# Patient Record
Sex: FEMALE | Race: WHITE | Employment: PART TIME | URBAN - METROPOLITAN AREA
[De-identification: names, ages, dates, MRNs, and addresses within clinical notes are randomized per-mention and may not be internally consistent; named-entity substitution may affect disease eponyms.]

---

## 2018-06-28 ENCOUNTER — OFFICE VISIT (OUTPATIENT)
Dept: ORTHOPEDIC SURGERY | Age: 53
End: 2018-06-28

## 2018-06-28 VITALS
HEIGHT: 66 IN | HEART RATE: 61 BPM | BODY MASS INDEX: 19.29 KG/M2 | SYSTOLIC BLOOD PRESSURE: 109 MMHG | WEIGHT: 120 LBS | DIASTOLIC BLOOD PRESSURE: 70 MMHG

## 2018-06-28 DIAGNOSIS — M75.01 ADHESIVE CAPSULITIS OF RIGHT SHOULDER: ICD-10-CM

## 2018-06-28 DIAGNOSIS — M25.511 RIGHT SHOULDER PAIN, UNSPECIFIED CHRONICITY: Primary | ICD-10-CM

## 2018-06-28 PROCEDURE — 20610 DRAIN/INJ JOINT/BURSA W/O US: CPT | Performed by: ORTHOPAEDIC SURGERY

## 2018-06-28 PROCEDURE — 99203 OFFICE O/P NEW LOW 30 MIN: CPT | Performed by: ORTHOPAEDIC SURGERY

## 2018-06-28 ASSESSMENT — ENCOUNTER SYMPTOMS: BACK PAIN: 1

## 2018-07-03 ENCOUNTER — EVALUATION (OUTPATIENT)
Dept: PHYSICAL THERAPY | Age: 53
End: 2018-07-03

## 2018-07-03 DIAGNOSIS — M25.611 DECREASED ROM OF RIGHT SHOULDER: ICD-10-CM

## 2018-07-03 DIAGNOSIS — M25.511 PAIN IN JOINT OF RIGHT SHOULDER: ICD-10-CM

## 2018-07-03 DIAGNOSIS — M75.01 ADHESIVE CAPSULITIS OF RIGHT SHOULDER: Primary | ICD-10-CM

## 2018-07-03 PROCEDURE — 97110 THERAPEUTIC EXERCISES: CPT | Performed by: PHYSICAL THERAPIST

## 2018-07-03 PROCEDURE — 97112 NEUROMUSCULAR REEDUCATION: CPT | Performed by: PHYSICAL THERAPIST

## 2018-07-03 PROCEDURE — 97161 PT EVAL LOW COMPLEX 20 MIN: CPT | Performed by: PHYSICAL THERAPIST

## 2018-07-03 NOTE — FLOWSHEET NOTE
[facility]   Phone: [phone]    Fax: [phone]        Physical Therapy Daily Treatment Note  Date:  7/3/2018    Patient Name:  Vida Patricia  \"Bell\"  :  1965  MRN: [id number]  Medical/Treatment Diagnosis Information:  · Diagnosis: R shoulder adhesive capsulitis  ·    Insurance/Certification information:  PT Insurance Information: Medical [place]  Physician Information:  Referring Practitioner: Dr. Kai Tejeda of care signed (Y/N): sent 7/3/18    Date of Patient follow up with Physician: 18    G-Code (if applicable):      Date G-Code Applied:  7/3/18  PT G-Codes  Functional Assessment Tool Used: UEFI  Score: 40/80, 50% limitation  Functional Limitation: Carrying, moving and handling objects  Carrying, Moving and Handling Objects Current Status (): At least 40 percent but less than 60 percent impaired, limited or restricted  Carrying, Moving and Handling Objects Goal Status (): At least 1 percent but less than 20 percent impaired, limited or restricted    Progress Note: [x]  Yes  []  No  Next due by: Visit #10 or 8/3/18      Latex Allergy:  [x]NO      []YES  Preferred Language for Healthcare:   [x]English       []other:    Visit # Insurance Allowable   1 40     Pain level:   4-5/10     SUBJECTIVE:  See eval    OBJECTIVE: See eval     ROM PROM AROM  Comment:  7/3/18     L R L R     Flexion     158° 112°     Abduction     180° 62°     ER     T4 C7     IR     T3 T12     Other             Other                    Strength L R Comment:  7/3/18   Flexion 5/5 3+/5*     Abduction 5/5 NT     ER 5/5 4+/5     IR 5/5 3+/5*           Special Tests Results/Comment:  7/3/18: Special tests were deferred as patient is unable to achieve/maintain test positions.    Stephane Glover     Other: empty can            RESTRICTIONS/PRECAUTIONS: R shoulder adhesive capsulitis    Exercises:  Exercise/Equipment Resistance/Repetitions Comments assist with reaching prior level of function. 2. Patient will demonstrate increased R shoulder flexion, abduction, ER, and IR AROM to >160°, >160°, T3, and >T8 respectively, to allow for proper joint functioning as indicated by patients Functional Deficits. 3. Patient will demonstrate an increase in R shoulder strength in all planes by a half grade or greater to allow for proper functional mobility as indicated by patients Functional Deficits. 4. Patient will return to all functional activities without increased symptoms or restriction. 5. Patient will be able to complete bathing and dressing activities with R UE without increased pain in the R shoulder so that she may return to PLOF for all ADLs. 6. Patient will be able to play 9 holes of golf without increased pain in the R shoulder so that she may return to PLOF. Progression Towards Functional goals:  [] Patient is progressing as expected towards functional goals listed. [] Progression is slowed due to complexities listed. [] Progression has been slowed due to co-morbidities.   [x] Plan just implemented, too soon to assess goals progression  [] Other:     ASSESSMENT:  See eval    Treatment/Activity Tolerance:  [x] Patient tolerated treatment well [] Patient limited by fatique  [] Patient limited by pain  [] Patient limited by other medical complications  [] Other:     Prognosis: [x] Good [] Fair  [] Poor    Patient Requires Follow-up: [x] Yes  [] No    PLAN: See eval  [] Continue per plan of care [] Alter current plan (see comments)  [x] Plan of care initiated [] Hold pending MD visit [] Discharge    Electronically signed by:      Morgan Beltran license: [id number] license: [id number]     Zuleyka Julio PT, DPT

## 2018-07-03 NOTE — PLAN OF CARE
in self care activities   [x]Reduced participation in home management activities   [x]Reduced participation in work activities   [x]Reduced participation in social activities. []Reduced participation in sport/recreation activities. Classification:   []Signs/symptoms consistent with post-surgical status including decreased ROM, strength and function.   []Signs/symptoms consistent with joint sprain/strain   []Signs/symptoms consistent with shoulder impingement   []Signs/symptoms consistent with shoulder/elbow/wrist tendinopathy   []Signs/symptoms consistent with Rotator cuff tear   []Signs/symptoms consistent with labral tear   []Signs/symptoms consistent with postural dysfunction    []Signs/symptoms consistent with Glenohumeral IR Deficit - <45 degrees   []Signs/symptoms consistent with facet dysfunction of cervical/thoracic spine    []Signs/symptoms consistent with pathology which may benefit from Dry needling     [x]other: Signs/symptoms consistent with adhesive capsulitis     Prognosis/Rehab Potential:      []Excellent   [x]Good    []Fair   []Poor    Tolerance of evaluation/treatment:    []Excellent   [x]Good    []Fair   []Poor    Physical Therapy Evaluation Complexity Justification  [x] A history of present problem with:  [] no personal factors and/or comorbidities that impact the plan of care;  [x]1-2 personal factors and/or comorbidities that impact the plan of care  []3 personal factors and/or comorbidities that impact the plan of care  [x] An examination of body systems using standardized tests and measures addressing any of the following: body structures and functions (impairments), activity limitations, and/or participation restrictions;:  [] a total of 1-2 or more elements   [x] a total of 3 or more elements   [] a total of 4 or more elements   [x] A clinical presentation with:  [x] stable and/or uncomplicated characteristics   [] evolving clinical presentation with changing characteristics  [] functioning as indicated by patients Functional Deficits. 3. Patient will demonstrate an increase in R shoulder strength in all planes by a half grade or greater to allow for proper functional mobility as indicated by patients Functional Deficits. 4. Patient will return to all functional activities without increased symptoms or restriction. 5. Patient will be able to complete bathing and dressing activities with R UE without increased pain in the R shoulder so that she may return to PLOF for all ADLs. 6. Patient will be able to play 9 holes of golf without increased pain in the R shoulder so that she may return to PLOF.       Electronically signed by:       Rahul Kemp license: [id number]  [place] PT license: [id number]     Joseph Barrett PT

## 2018-07-05 ENCOUNTER — TREATMENT (OUTPATIENT)
Dept: PHYSICAL THERAPY | Age: 53
End: 2018-07-05

## 2018-07-05 DIAGNOSIS — M25.511 PAIN IN JOINT OF RIGHT SHOULDER: ICD-10-CM

## 2018-07-05 DIAGNOSIS — M75.01 ADHESIVE CAPSULITIS OF RIGHT SHOULDER: Primary | ICD-10-CM

## 2018-07-05 DIAGNOSIS — M25.611 DECREASED ROM OF RIGHT SHOULDER: ICD-10-CM

## 2018-07-05 PROCEDURE — 97140 MANUAL THERAPY 1/> REGIONS: CPT | Performed by: PHYSICAL THERAPIST

## 2018-07-05 PROCEDURE — 97112 NEUROMUSCULAR REEDUCATION: CPT | Performed by: PHYSICAL THERAPIST

## 2018-07-05 PROCEDURE — 97110 THERAPEUTIC EXERCISES: CPT | Performed by: PHYSICAL THERAPIST

## 2018-07-05 NOTE — FLOWSHEET NOTE
Alvarado Glover     Other: empty can            RESTRICTIONS/PRECAUTIONS: R shoulder adhesive capsulitis    Exercises:  Exercise/Equipment Resistance/Repetitions Comments   Cardio/Warm-up     Bike          Stretching/PROM     Wand 10x10\" At 0° abd   Table Slides 10x10\"    UE [place]     Pulleys 4'    Pendulum      BBIR 10x10\"    UT stretch 10x5\"    CBA 10x10\"    Biceps stretch 10x10\" W/ cane   Doorway pec stretch 10x10\"              STRENGTH     Isometrics     Retraction 20x5\"         Weight shift     Flexion     Abduction     External Rotation     Internal Rotation     Biceps     Triceps          PRE's     Flexion     Abduction     External Rotation     Internal Rotation     Shrugs     EXT     Reverse Flys     Serratus     Horizontal Abd with ER     Biceps     Triceps     Retraction          Cable Column/Theraband     External Rotation     Internal Rotation     Shrugs     Lats     Ext     Flex     Scapular Retraction     BIC     TRIC     PNF          Neuromuscular Re-ed     Dynamic Stability                              Plyoback                    Manual/Modalities       CP to R shoulder x10'     Manual PROM of R shoulders in all planes, 10'               Therapeutic Exercise and NMR EXR  [x] (34992) Provided verbal/tactile cueing for activities related to strengthening, flexibility, endurance, ROM  for improvements in scapular, scapulothoracic and UE control with self care, reaching, carrying, lifting, house/yardwork, driving/computer work. [x] (48766) Provided verbal/tactile cueing for activities related to improving balance, coordination, kinesthetic sense, posture, motor skill, proprioception  to assist with  scapular, scapulothoracic and UE control with self care, reaching, carrying, lifting, house/yardwork, driving/computer work.     Therapeutic Activities:    [] (00934 or 09840) Provided verbal/tactile cueing for activities related to improving balance, coordination, kinesthetic sense, posture, tolerance, in order to prevent re-injury. 2. Patient will have a decrease in pain to facilitate improvement in movement, function, and ADLs as indicated by Functional Deficits. Long Term Goals: To be achieved in: 6 weeks  1. Disability index score of 10% or less for the UEFI to assist with reaching prior level of function. 2. Patient will demonstrate increased R shoulder flexion, abduction, ER, and IR AROM to >160°, >160°, T3, and >T8 respectively, to allow for proper joint functioning as indicated by patients Functional Deficits. 3. Patient will demonstrate an increase in R shoulder strength in all planes by a half grade or greater to allow for proper functional mobility as indicated by patients Functional Deficits. 4. Patient will return to all functional activities without increased symptoms or restriction. 5. Patient will be able to complete bathing and dressing activities with R UE without increased pain in the R shoulder so that she may return to PLOF for all ADLs. 6. Patient will be able to play 9 holes of golf without increased pain in the R shoulder so that she may return to PLOF. Progression Towards Functional goals:  [] Patient is progressing as expected towards functional goals listed. [] Progression is slowed due to complexities listed. [] Progression has been slowed due to co-morbidities. [x] Plan just implemented, too soon to assess goals progression  [] Other:     ASSESSMENT:  Patient required moderate verbal and tactile cues today to correct form for exercises included in her HEP. She tolerated exercise progressions moderately well today, with no reports of increased pain in the R shoulder. PT and patient reviewed HEP and she expressed improved understanding of the program. Plan to progress mobility activities per patient tolerance.     Treatment/Activity Tolerance:  [x] Patient tolerated treatment well [] Patient limited by fatique  [] Patient limited by pain  [] Patient

## 2018-07-09 ENCOUNTER — TREATMENT (OUTPATIENT)
Dept: PHYSICAL THERAPY | Age: 53
End: 2018-07-09

## 2018-07-09 DIAGNOSIS — M25.511 PAIN IN JOINT OF RIGHT SHOULDER: ICD-10-CM

## 2018-07-09 DIAGNOSIS — M25.611 DECREASED ROM OF RIGHT SHOULDER: ICD-10-CM

## 2018-07-09 DIAGNOSIS — M75.01 ADHESIVE CAPSULITIS OF RIGHT SHOULDER: Primary | ICD-10-CM

## 2018-07-09 PROCEDURE — 97112 NEUROMUSCULAR REEDUCATION: CPT | Performed by: PHYSICAL THERAPIST

## 2018-07-09 PROCEDURE — 97140 MANUAL THERAPY 1/> REGIONS: CPT | Performed by: PHYSICAL THERAPIST

## 2018-07-09 PROCEDURE — 97110 THERAPEUTIC EXERCISES: CPT | Performed by: PHYSICAL THERAPIST

## 2018-07-09 NOTE — FLOWSHEET NOTE
[facility]   Phone: [phone]    Fax: [phone]        Physical Therapy Daily Treatment Note  Date:  2018    Patient Name:  Tata Guerrero  \"Bell\"  :  1965  MRN: [id number]  Medical/Treatment Diagnosis Information:  · Diagnosis: R shoulder adhesive capsulitis     Insurance/Certification information:  PT Insurance Information: Medical [place]  Physician Information:  Referring Practitioner: Dr. Miguelina Wang of care signed (Y/N): sent 7/3/18    Date of Patient follow up with Physician: 18    G-Code (if applicable):      Date G-Code Applied:  7/3/18  PT G-Codes  Functional Assessment Tool Used: UEFI  Score: 40/80, 50% limitation  Functional Limitation: Carrying, moving and handling objects  Carrying, Moving and Handling Objects Current Status (): At least 40 percent but less than 60 percent impaired, limited or restricted  Carrying, Moving and Handling Objects Goal Status (): At least 1 percent but less than 20 percent impaired, limited or restricted    Progress Note: []  Yes  [x]  No  Next due by: Visit #10 or 8/3/18      Latex Allergy:  [x]NO      []YES  Preferred Language for Healthcare:   [x]English       []other:    Visit # Insurance Allowable   3 40     Pain level:   4-5/10     SUBJECTIVE:  Patient reports that she is seeing improvements in her AROM and is better able to reach [place] with the R UE.    OBJECTIVE: See eval             ROM PROM AROM  Comment:  7/3/18     L R L R     Flexion     158° 112°     Abduction     180° 62°     ER     T4 C7     IR     T3 T12     Other             Other                    Strength L R Comment:  7/3/18   Flexion 5/5 3+/5*     Abduction 5/5 NT     ER 5/5 4+/5     IR 5/5 3+/5*           Special Tests Results/Comment:  7/3/18: Special tests were deferred as patient is unable to achieve/maintain test positions.    Stephane Glover     Other: empty can            RESTRICTIONS/PRECAUTIONS: R shoulder adhesive capsulitis    Exercises:  Exercise/Equipment Resistance/Repetitions Comments   Cardio/Warm-up     Bike          Stretching/PROM     Wand 10x10\" At 0° abd   Table Slides 10x10\"    UE [place]     Pulleys 5'    Pendulum      BBIR 10x10\"    UT stretch 10x5\"    CBA 10x10\"    Biceps stretch 10x10\" W/ cane   Doorway pec stretch 10x10\"    Wall slides 10x10\"         STRENGTH     Isometrics     Retraction 20x5\"         Weight shift     Flexion     Abduction     External Rotation     Internal Rotation     Biceps     Triceps          PRE's     Flexion     Abduction     External Rotation     Internal Rotation     Shrugs     EXT     Reverse Flys     Serratus     Horizontal Abd with ER     Biceps     Triceps     Retraction          Cable Column/Theraband     External Rotation     Internal Rotation     Shrugs     Lats     Ext     Flex     Scapular Retraction     BIC     TRIC     PNF          Neuromuscular Re-ed     Dynamic Stability                              Plyoback                    Manual/Modalities       CP to R shoulder x10'     Manual PROM of R shoulders in all planes, 10'               Therapeutic Exercise and NMR EXR  [x] (47757) Provided verbal/tactile cueing for activities related to strengthening, flexibility, endurance, ROM  for improvements in scapular, scapulothoracic and UE control with self care, reaching, carrying, lifting, house/yardwork, driving/computer work. [x] (06436) Provided verbal/tactile cueing for activities related to improving balance, coordination, kinesthetic sense, posture, motor skill, proprioception  to assist with  scapular, scapulothoracic and UE control with self care, reaching, carrying, lifting, house/yardwork, driving/computer work.     Therapeutic Activities:    [] (53931 or 63142) Provided verbal/tactile cueing for activities related to improving balance, coordination, kinesthetic sense, posture, motor skill, proprioception and motor activation to allow for proper function of scapular, scapulothoracic and UE control with self care, carrying, lifting, driving/computer work. Home Exercise Program:    [x] (60530) Reviewed/Progressed HEP activities related to strengthening, flexibility, endurance, ROM of scapular, scapulothoracic and UE control with self care, reaching, carrying, lifting, house/yardwork, driving/computer work  [] (67435) Reviewed/Progressed HEP activities related to improving balance, coordination, kinesthetic sense, posture, motor skill, proprioception of scapular, scapulothoracic and UE control with self care, reaching, carrying, lifting, house/yardwork, driving/computer work      Manual Treatments:  PROM / STM / Oscillations-Mobs:  G-I, II, III, IV (PA's, Inf., Post.)  [] (97057) Provided manual therapy to mobilize soft tissue/joints of cervical/CT, scapular GHJ and UE for the purpose of modulating pain, promoting relaxation,  increasing ROM, reducing/eliminating soft tissue swelling/inflammation/restriction, improving soft tissue extensibility and allowing for proper ROM for normal function with self care, reaching, carrying, lifting, house/yardwork, driving/computer work    Modalities:  CP to R shoulder x10'    Charges:  Timed Code Treatment Minutes: 50   Total Treatment Minutes: 70     [] EVAL (LOW) 32061 (typically 20 minutes face-to-face)  [] EVAL (MOD) 57019 (typically 30 minutes face-to-face)  [] EVAL (HIGH) 93101 (typically 45 minutes face-to-face)  [] RE-EVAL   [x] SY(45239) x  1   [] IONTO  [x] NMR (22147) x  1   [] VASO  [x] Manual (48729) x  1    [] Other:  [] TA (95744) x       [] Mech Traction (82833)  [] ES(attended) (00436)      [] ES (un) (36598):     GOALS:  Patient stated goal: Able to play golf without increased pain in the R shoulder. Therapist goals for Patient:   Short Term Goals: To be achieved in: 2 weeks  1. Independent in HEP and progression per patient tolerance, in order to prevent re-injury.    2. Patient will have a decrease in pain to facilitate improvement in movement, function, and ADLs as indicated by Functional Deficits. Long Term Goals: To be achieved in: 6 weeks  1. Disability index score of 10% or less for the UEFI to assist with reaching prior level of function. 2. Patient will demonstrate increased R shoulder flexion, abduction, ER, and IR AROM to >160°, >160°, T3, and >T8 respectively, to allow for proper joint functioning as indicated by patients Functional Deficits. 3. Patient will demonstrate an increase in R shoulder strength in all planes by a half grade or greater to allow for proper functional mobility as indicated by patients Functional Deficits. 4. Patient will return to all functional activities without increased symptoms or restriction. 5. Patient will be able to complete bathing and dressing activities with R UE without increased pain in the R shoulder so that she may return to PLOF for all ADLs. 6. Patient will be able to play 9 holes of golf without increased pain in the R shoulder so that she may return to PLOF. Progression Towards Functional goals:  [] Patient is progressing as expected towards functional goals listed. [] Progression is slowed due to complexities listed. [] Progression has been slowed due to co-morbidities. [x] Plan just implemented, too soon to assess goals progression  [] Other:     ASSESSMENT:  Patient demonstrates improving active ROM of the R shoulder throughout today's session. She requires moderate VCs to initiate all exercises and to achieve and maintain proper form. She was instructed to add table walk backs to her HEP. Plan to progress mobility activities per patient tolerance.     Treatment/Activity Tolerance:  [x] Patient tolerated treatment well [] Patient limited by fatique  [] Patient limited by pain  [] Patient limited by other medical complications  [] Other:     Prognosis: [x] Good [] Fair  [] Poor    Patient Requires Follow-up: [x] Yes  []

## 2018-07-11 ENCOUNTER — TREATMENT (OUTPATIENT)
Dept: PHYSICAL THERAPY | Age: 53
End: 2018-07-11

## 2018-07-11 DIAGNOSIS — M25.611 DECREASED ROM OF RIGHT SHOULDER: ICD-10-CM

## 2018-07-11 DIAGNOSIS — M25.511 PAIN IN JOINT OF RIGHT SHOULDER: ICD-10-CM

## 2018-07-11 DIAGNOSIS — M75.01 ADHESIVE CAPSULITIS OF RIGHT SHOULDER: Primary | ICD-10-CM

## 2018-07-11 PROCEDURE — 97110 THERAPEUTIC EXERCISES: CPT | Performed by: PHYSICAL THERAPIST

## 2018-07-11 PROCEDURE — 97140 MANUAL THERAPY 1/> REGIONS: CPT | Performed by: PHYSICAL THERAPIST

## 2018-07-11 PROCEDURE — 97112 NEUROMUSCULAR REEDUCATION: CPT | Performed by: PHYSICAL THERAPIST

## 2018-07-11 NOTE — FLOWSHEET NOTE
activities related to improving balance, coordination, kinesthetic sense, posture, motor skill, proprioception and motor activation to allow for proper function of scapular, scapulothoracic and UE control with self care, carrying, lifting, driving/computer work. Home Exercise Program:    [x] (22476) Reviewed/Progressed HEP activities related to strengthening, flexibility, endurance, ROM of scapular, scapulothoracic and UE control with self care, reaching, carrying, lifting, house/yardwork, driving/computer work  [] (68693) Reviewed/Progressed HEP activities related to improving balance, coordination, kinesthetic sense, posture, motor skill, proprioception of scapular, scapulothoracic and UE control with self care, reaching, carrying, lifting, house/yardwork, driving/computer work      Manual Treatments:  PROM / STM / Oscillations-Mobs:  G-I, II, III, IV (PA's, Inf., Post.)  [] (71824) Provided manual therapy to mobilize soft tissue/joints of cervical/CT, scapular GHJ and UE for the purpose of modulating pain, promoting relaxation,  increasing ROM, reducing/eliminating soft tissue swelling/inflammation/restriction, improving soft tissue extensibility and allowing for proper ROM for normal function with self care, reaching, carrying, lifting, house/yardwork, driving/computer work    Modalities:  CP to R shoulder x10'    Charges:  Timed Code Treatment Minutes: 40   Total Treatment Minutes: 50     [] EVAL (LOW) 39232 (typically 20 minutes face-to-face)  [] EVAL (MOD) 39081 (typically 30 minutes face-to-face)  [] EVAL (HIGH) 82653 (typically 45 minutes face-to-face)  [] RE-EVAL   [x] MD(73081) x  1   [] IONTO  [x] NMR (60535) x  1   [] VASO  [x] Manual (21687) x  1    [] Other:  [] TA (74492) x       [] Mech Traction (38480)  [] ES(attended) (55160)      [] ES (un) (07841):     GOALS:  Patient stated goal: Able to play golf without increased pain in the R shoulder.     Therapist goals for Patient:   Short Term Goals: reduce stress on L shoulder).     Treatment/Activity Tolerance:  [x] Patient tolerated treatment well [] Patient limited by fatique  [] Patient limited by pain  [] Patient limited by other medical complications  [] Other:     Prognosis: [x] Good [] Fair  [] Poor    Patient Requires Follow-up: [x] Yes  [] No    PLAN: See eval  [x] Continue per plan of care [] Alter current plan (see comments)  [] Plan of care initiated [] Hold pending MD visit [] Discharge    Electronically signed by:      Yue Catherine license: [id number]  [id number] license: [id number]     Sincere Johnson PT, DPT

## 2018-07-16 ENCOUNTER — TREATMENT (OUTPATIENT)
Dept: PHYSICAL THERAPY | Age: 53
End: 2018-07-16

## 2018-07-16 DIAGNOSIS — M25.611 DECREASED ROM OF RIGHT SHOULDER: ICD-10-CM

## 2018-07-16 DIAGNOSIS — M75.01 ADHESIVE CAPSULITIS OF RIGHT SHOULDER: Primary | ICD-10-CM

## 2018-07-16 DIAGNOSIS — M25.511 PAIN IN JOINT OF RIGHT SHOULDER: ICD-10-CM

## 2018-07-16 PROCEDURE — 97112 NEUROMUSCULAR REEDUCATION: CPT | Performed by: PHYSICAL THERAPIST

## 2018-07-16 PROCEDURE — 97110 THERAPEUTIC EXERCISES: CPT | Performed by: PHYSICAL THERAPIST

## 2018-07-16 PROCEDURE — 97140 MANUAL THERAPY 1/> REGIONS: CPT | Performed by: PHYSICAL THERAPIST

## 2018-07-16 NOTE — FLOWSHEET NOTE
weeks  1. Independent in HEP and progression per patient tolerance, in order to prevent re-injury. 2. Patient will have a decrease in pain to facilitate improvement in movement, function, and ADLs as indicated by Functional Deficits. Long Term Goals: To be achieved in: 6 weeks  1. Disability index score of 10% or less for the UEFI to assist with reaching prior level of function. 2. Patient will demonstrate increased R shoulder flexion, abduction, ER, and IR AROM to >160°, >160°, T3, and >T8 respectively, to allow for proper joint functioning as indicated by patients Functional Deficits. 3. Patient will demonstrate an increase in R shoulder strength in all planes by a half grade or greater to allow for proper functional mobility as indicated by patients Functional Deficits. 4. Patient will return to all functional activities without increased symptoms or restriction. 5. Patient will be able to complete bathing and dressing activities with R UE without increased pain in the R shoulder so that she may return to PLOF for all ADLs. 6. Patient will be able to play 9 holes of golf without increased pain in the R shoulder so that she may return to PLOF. Progression Towards Functional goals:  [] Patient is progressing as expected towards functional goals listed. [] Progression is slowed due to complexities listed. [] Progression has been slowed due to co-morbidities. [x] Plan just implemented, too soon to assess goals progression  [] Other:     ASSESSMENT:  Patient continues to demonstrate good progress in R shoulder PROM in all planes and R shoulder flexion AROM, though limitations remain present. She reports moderate fatigue following TB activities and ball on wall today. Plan to progress mobility and strengthening activities per patient tolerance.     Treatment/Activity Tolerance:  [x] Patient tolerated treatment well [] Patient limited by fatique  [] Patient limited by pain  [] Patient limited by

## 2018-07-18 ENCOUNTER — TREATMENT (OUTPATIENT)
Dept: PHYSICAL THERAPY | Age: 53
End: 2018-07-18

## 2018-07-18 DIAGNOSIS — M25.611 DECREASED ROM OF RIGHT SHOULDER: ICD-10-CM

## 2018-07-18 DIAGNOSIS — M25.511 PAIN IN JOINT OF RIGHT SHOULDER: ICD-10-CM

## 2018-07-18 DIAGNOSIS — M75.01 ADHESIVE CAPSULITIS OF RIGHT SHOULDER: Primary | ICD-10-CM

## 2018-07-18 PROCEDURE — 97112 NEUROMUSCULAR REEDUCATION: CPT | Performed by: PHYSICAL THERAPIST

## 2018-07-18 PROCEDURE — 97110 THERAPEUTIC EXERCISES: CPT | Performed by: PHYSICAL THERAPIST

## 2018-07-18 PROCEDURE — 97140 MANUAL THERAPY 1/> REGIONS: CPT | Performed by: PHYSICAL THERAPIST

## 2018-07-18 NOTE — FLOWSHEET NOTE
[facility]   Phone: [phone]    Fax: [phone]        Physical Therapy Daily Treatment Note  Date:  2018    Patient Name:  Shira Nair  \"Bell\"  :  1965  MRN: [id number]  Medical/Treatment Diagnosis Information:  · Diagnosis: R shoulder adhesive capsulitis     Insurance/Certification information:  PT Insurance Information: Medical [place]  Physician Information:  Referring Practitioner: Dr. Quincy Chavez of care signed (Y/N): sent 7/3/18    Date of Patient follow up with Physician: 18    G-Code (if applicable):      Date G-Code Applied:  7/3/18  PT G-Codes  Functional Assessment Tool Used: UEFI  Score: 40/80, 50% limitation  Functional Limitation: Carrying, moving and handling objects  Carrying, Moving and Handling Objects Current Status (): At least 40 percent but less than 60 percent impaired, limited or restricted  Carrying, Moving and Handling Objects Goal Status (): At least 1 percent but less than 20 percent impaired, limited or restricted    Progress Note: []  Yes  [x]  No  Next due by: Visit #10 or 8/3/18      Latex Allergy:  [x]NO      []YES  Preferred Language for Healthcare:   [x]English       []other:    Visit # Insurance Allowable   6 40     Pain level:   4-5/10     SUBJECTIVE:  Patient reports she still has limitations, but notes that motion seems to be improving in all directions, especially when reaching behind her back. OBJECTIVE: See eval             ROM PROM AROM  Comment:  7/3/18     L R L R     Flexion     158° 112°     Abduction     180° 62°     ER     T4 C7     IR     T3 T12     Other             Other                    Strength L R Comment:  7/3/18   Flexion 5/5 3+/5*     Abduction 5/5 NT     ER 5/5 4+/5     IR 5/5 3+/5*           Special Tests Results/Comment:  7/3/18: Special tests were deferred as patient is unable to achieve/maintain test positions.    Stephane Glover     Other: empty

## 2018-07-23 ENCOUNTER — TREATMENT (OUTPATIENT)
Dept: PHYSICAL THERAPY | Age: 53
End: 2018-07-23

## 2018-07-23 DIAGNOSIS — M75.01 ADHESIVE CAPSULITIS OF RIGHT SHOULDER: Primary | ICD-10-CM

## 2018-07-23 DIAGNOSIS — M25.611 DECREASED ROM OF RIGHT SHOULDER: ICD-10-CM

## 2018-07-23 DIAGNOSIS — M25.511 PAIN IN JOINT OF RIGHT SHOULDER: ICD-10-CM

## 2018-07-23 PROCEDURE — 97112 NEUROMUSCULAR REEDUCATION: CPT | Performed by: PHYSICAL THERAPIST

## 2018-07-23 PROCEDURE — 97140 MANUAL THERAPY 1/> REGIONS: CPT | Performed by: PHYSICAL THERAPIST

## 2018-07-23 PROCEDURE — 97110 THERAPEUTIC EXERCISES: CPT | Performed by: PHYSICAL THERAPIST

## 2018-07-23 NOTE — FLOWSHEET NOTE
[facility]   Phone: [phone]    Fax: [phone]        Physical Therapy Daily Treatment Note  Date:  2018    Patient Name:  Claudia Gonzalez  \"Bell\"  :  1965  MRN: [id number]  Medical/Treatment Diagnosis Information:  · Diagnosis: R shoulder adhesive capsulitis     Insurance/Certification information:  PT Insurance Information: Medical [place]  Physician Information:  Referring Practitioner: Dr. Karlene Squires of care signed (Y/N): sent 7/3/18    Date of Patient follow up with Physician: 18    G-Code (if applicable):      Date G-Code Applied:  7/3/18  PT G-Codes  Functional Assessment Tool Used: UEFI  Score: 40/80, 50% limitation  Functional Limitation: Carrying, moving and handling objects  Carrying, Moving and Handling Objects Current Status (): At least 40 percent but less than 60 percent impaired, limited or restricted  Carrying, Moving and Handling Objects Goal Status (): At least 1 percent but less than 20 percent impaired, limited or restricted    Progress Note: []  Yes  [x]  No  Next due by: Visit #10 or 8/3/18      Latex Allergy:  [x]NO      []YES  Preferred Language for Healthcare:   [x]English       []other:    Visit # Insurance Allowable   7 40     Pain level:   4-5/10     SUBJECTIVE:  Patient reports that the motion in R shoulder continues to improve, but notes that she is still having discomfort in the L shoulder. OBJECTIVE: See eval             ROM PROM AROM  Comment:  7/3/18     L R L R     Flexion     158° 112°     Abduction     180° 62°     ER     T4 C7     IR     T3 T12     Other             Other                    Strength L R Comment:  7/3/18   Flexion 5/5 3+/5*     Abduction 5/5 NT     ER 5/5 4+/5     IR 5/5 3+/5*           Special Tests Results/Comment:  7/3/18: Special tests were deferred as patient is unable to achieve/maintain test positions.    Stephane Glover     Other: empty can   coordination, kinesthetic sense, posture, motor skill, proprioception and motor activation to allow for proper function of scapular, scapulothoracic and UE control with self care, carrying, lifting, driving/computer work. Home Exercise Program:    [x] (83119) Reviewed/Progressed HEP activities related to strengthening, flexibility, endurance, ROM of scapular, scapulothoracic and UE control with self care, reaching, carrying, lifting, house/yardwork, driving/computer work  [] (41337) Reviewed/Progressed HEP activities related to improving balance, coordination, kinesthetic sense, posture, motor skill, proprioception of scapular, scapulothoracic and UE control with self care, reaching, carrying, lifting, house/yardwork, driving/computer work      Manual Treatments:  PROM / STM / Oscillations-Mobs:  G-I, II, III, IV (PA's, Inf., Post.)  [] (30239) Provided manual therapy to mobilize soft tissue/joints of cervical/CT, scapular GHJ and UE for the purpose of modulating pain, promoting relaxation,  increasing ROM, reducing/eliminating soft tissue swelling/inflammation/restriction, improving soft tissue extensibility and allowing for proper ROM for normal function with self care, reaching, carrying, lifting, house/yardwork, driving/computer work    Modalities:      Charges:  Timed Code Treatment Minutes: 40   Total Treatment Minutes: 55     [] EVAL (LOW) 61498 (typically 20 minutes face-to-face)  [] EVAL (MOD) 73045 (typically 30 minutes face-to-face)  [] EVAL (HIGH) 00601 (typically 45 minutes face-to-face)  [] RE-EVAL   [x] DF(61928) x  1   [] IONTO  [x] NMR (55732) x  1   [] VASO  [x] Manual (33871) x  1    [] Other:  [] TA (51090) x       [] Mech Traction (93170)  [] ES(attended) (93023)      [] ES (un) (73306):     GOALS:  Patient stated goal: Able to play golf without increased pain in the R shoulder. Therapist goals for Patient:   Short Term Goals: To be achieved in: 2 weeks  1.  Independent in HEP and

## 2018-07-25 ENCOUNTER — TREATMENT (OUTPATIENT)
Dept: PHYSICAL THERAPY | Age: 53
End: 2018-07-25

## 2018-07-25 DIAGNOSIS — M75.01 ADHESIVE CAPSULITIS OF RIGHT SHOULDER: Primary | ICD-10-CM

## 2018-07-25 DIAGNOSIS — M25.511 PAIN IN JOINT OF RIGHT SHOULDER: ICD-10-CM

## 2018-07-25 DIAGNOSIS — M25.611 DECREASED ROM OF RIGHT SHOULDER: ICD-10-CM

## 2018-07-25 PROCEDURE — 97140 MANUAL THERAPY 1/> REGIONS: CPT | Performed by: PHYSICAL THERAPIST

## 2018-07-25 PROCEDURE — 97110 THERAPEUTIC EXERCISES: CPT | Performed by: PHYSICAL THERAPIST

## 2018-07-25 PROCEDURE — 97112 NEUROMUSCULAR REEDUCATION: CPT | Performed by: PHYSICAL THERAPIST

## 2018-07-31 ENCOUNTER — TREATMENT (OUTPATIENT)
Dept: PHYSICAL THERAPY | Age: 53
End: 2018-07-31

## 2018-07-31 DIAGNOSIS — M25.611 DECREASED ROM OF RIGHT SHOULDER: ICD-10-CM

## 2018-07-31 DIAGNOSIS — M25.511 PAIN IN JOINT OF RIGHT SHOULDER: ICD-10-CM

## 2018-07-31 DIAGNOSIS — M75.01 ADHESIVE CAPSULITIS OF RIGHT SHOULDER: Primary | ICD-10-CM

## 2018-07-31 PROCEDURE — 97110 THERAPEUTIC EXERCISES: CPT | Performed by: PHYSICAL THERAPIST

## 2018-07-31 PROCEDURE — 97112 NEUROMUSCULAR REEDUCATION: CPT | Performed by: PHYSICAL THERAPIST

## 2018-07-31 PROCEDURE — 97140 MANUAL THERAPY 1/> REGIONS: CPT | Performed by: PHYSICAL THERAPIST

## 2018-07-31 NOTE — PLAN OF CARE
[facility]   Phone: [phone]    Fax: [phone]   Physical Therapy Re-Certification Plan of Care    Dear Dr. Darlene Brewer,    We had the pleasure of treating the following patient for physical therapy services at [address] [place] [address]. A summary of our findings can be found in the updated assessment below. This includes our plan of care. If you have any questions or concerns regarding these findings, please do not hesitate to contact me at the office phone number checked above. Thank you for the referral.     Physician Signature:________________________________Date:__________________  By signing above (or electronic signature), therapists plan is approved by physician      Overall Response to Treatment:   [x]Patient is responding well to treatment and improvement is noted with regards  to goals   []Patient should continue to improve in reasonable time if they continue HEP   []Patient has plateaued and is no longer responding to skilled PT intervention    []Patient is getting worse and would benefit from return to referring MD   []Patient unable to adhere to initial POC   []Other:         Physical Therapy Daily Treatment Note  Date:  2018    Patient Name:  Angy Ortiz  \"Bell\"  :  1965  MRN: [id number]  Medical/Treatment Diagnosis Information:  · Diagnosis: R shoulder adhesive capsulitis     Insurance/Certification information:  PT Insurance Information: Medical [place]  Physician Information:  Referring Practitioner: Dr. Bowman Raymond of care signed (Y/N): sent 18    Date of Patient follow up with Physician: 18    G-Code (if applicable):      Date G-Code Applied:  7/3/18  PT G-Codes  Functional Assessment Tool Used: UEFI  Score: 40/80, 50% limitation  Functional Limitation: Carrying, moving and handling objects  Carrying, Moving and Handling Objects Current Status ():  At least 40 percent but less than 60 percent impaired, limited or restricted  Carrying, Moving and Handling Objects Goal Status (): At least 1 percent but less than 20 percent impaired, limited or restricted    Progress Note: []  Yes  [x]  No  Next due by: 8/30/18      Latex Allergy:  [x]NO      []YES  Preferred Language for Healthcare:   [x]English       []other:    Visit # Insurance Allowable   9 40     Pain level:   2/10     SUBJECTIVE:  Patient reports that she tried to push her shoulder a little bit more over the weekend. She notes that it was more sore, and she was icing more frequently, but overall she is doing pretty well. She is still very pleased with her motion. OBJECTIVE: See eval             ROM PROM AROM  Comment:  7/31/18     L R L R     Flexion     158° 140°     Abduction     180° 113°     ER     T4 C7     IR     T3 T10     Other             Other                    Strength L R Comment:  7/31/18   Flexion 5/5 4-/5     Abduction 5/5 3+/5     ER 5/5 4+/5     IR 5/5 4-/5*           Special Tests Results/Comment:  7/31/18: Special tests were deferred as patient is unable to achieve/maintain test positions.    Stephane Glover     Other: empty can            RESTRICTIONS/PRECAUTIONS: R shoulder adhesive capsulitis    Exercises:  Exercise/Equipment Resistance/Repetitions Comments   Cardio/Warm-up     Bike          Stretching/PROM     Wand 10x10\" At 0° abd   Table Slides 10x10\"    Supine wand flexion  10x10\"    Pulleys 6'    Pendulum      BBIR 10x10\"    UT stretch 10x5\"    CBA 10x10\"    Biceps stretch 10x10\" W/ cane   Doorway pec stretch 10x10\"    Wall slides 10x10\"    Table walk back 10x10\"         STRENGTH     Isometrics     Retraction 20x5\"         Weight shift     Flexion     Abduction     External Rotation     Internal Rotation     Biceps     Triceps          PRE's     Flexion     Abduction     External Rotation     Internal Rotation     Shrugs     EXT     Reverse Flys     Serratus     Horizontal Abd with ER     Biceps indicated by patients Functional Deficits. ONGOING  4. Patient will return to all functional activities without increased symptoms or restriction. 5. Patient will be able to complete bathing and dressing activities with R UE without increased pain in the R shoulder so that she may return to PLOF for all ADLs. ONGOING  6. Patient will be able to play 9 holes of golf without increased pain in the R shoulder so that she may return to PLOF. ONGOING     Progression Towards Functional goals:  [x] Patient is progressing as expected towards functional goals listed. [] Progression is slowed due to complexities listed. [] Progression has been slowed due to co-morbidities. [] Plan just implemented, too soon to assess goals progression  [] Other:     ASSESSMENT:  Patient demonstrates good progress toward all LTGs previously set by PT. Her R shoulder active and passive ROM continues to steadily improve. Patient has been having some pain with the L shoulder and she is concerned about this. PT discussed covering the L shoulder pain with MD at her follow up appointment. Patient would benefit from continued skilled PT services to address remaining deficits in R shoulder ROM, strength, and function so that she may return to PLOF for all ADLs.     Treatment/Activity Tolerance:  [x] Patient tolerated treatment well [] Patient limited by fatique  [] Patient limited by pain  [] Patient limited by other medical complications  [] Other:     Prognosis: [x] Good [] Fair  [] Poor    Patient Requires Follow-up: [x] Yes  [] No    PLAN: 1-2x/week for 4-6 weeks  [x] Continue per plan of care [] Alter current plan (see comments)  [] Plan of care initiated [] Hold pending MD visit [] Discharge    Electronically signed by:      Bertha Sauer license: [id number]  [id number] [place] [address] license: [id number]     Kamlesh Hodges PT, DPT

## 2018-08-09 ENCOUNTER — OFFICE VISIT (OUTPATIENT)
Dept: ORTHOPEDIC SURGERY | Age: 53
End: 2018-08-09

## 2018-08-09 VITALS
HEIGHT: 66 IN | DIASTOLIC BLOOD PRESSURE: 71 MMHG | WEIGHT: 120 LBS | BODY MASS INDEX: 19.29 KG/M2 | SYSTOLIC BLOOD PRESSURE: 107 MMHG

## 2018-08-09 DIAGNOSIS — M25.512 LEFT SHOULDER PAIN, UNSPECIFIED CHRONICITY: ICD-10-CM

## 2018-08-09 DIAGNOSIS — M75.42 SHOULDER IMPINGEMENT SYNDROME, LEFT: Primary | ICD-10-CM

## 2018-08-09 DIAGNOSIS — M75.01 ADHESIVE CAPSULITIS OF RIGHT SHOULDER: ICD-10-CM

## 2018-08-09 PROCEDURE — 99214 OFFICE O/P EST MOD 30 MIN: CPT | Performed by: ORTHOPAEDIC SURGERY

## 2018-08-09 NOTE — PROGRESS NOTES
Requested Specialty:   Physical Therapy     Number of Visits Requested:   1       Treatment Plan:  I'm recommending that she keep up with physical therapy this time for both shoulders. He new prescription was provided. She can continue with turmeric. I would hold off on a corticosteroid injection for the left shoulder but this is a consideration if symptoms progress or do not improve. I recommended follow-up in 6 weeks. She can call and cancel if she is doing poorly. Otherwise we will consider a corticosteroid injection for the left shoulder versus workup with MRI if her left shoulder symptoms persist or progress. She is agreeable with this plan, and all questions were answered today. Aldo Santiago MD, PhD  8/9/2018

## 2018-08-09 NOTE — LETTER
Shira Clock  1965    Left shoulder rotator cuff tendinitis and biceps tendinitis, right resolving adhesive capsulitis. Stretching 4 quadrants all planes bilaterally. Include sleeper stretch. Then strengthening rotator cuff and periscapular muscles. Modalities p.r.n. Emphasize home exercise program.  Once a week for 4-6 weeks or as needed. Aldo Kennedy MD, PhD  8/9/2018

## 2018-08-13 ENCOUNTER — TREATMENT (OUTPATIENT)
Dept: PHYSICAL THERAPY | Age: 53
End: 2018-08-13

## 2018-08-13 DIAGNOSIS — M25.512 LEFT SHOULDER PAIN, UNSPECIFIED CHRONICITY: ICD-10-CM

## 2018-08-13 DIAGNOSIS — M25.511 PAIN IN JOINT OF RIGHT SHOULDER: ICD-10-CM

## 2018-08-13 DIAGNOSIS — M75.01 ADHESIVE CAPSULITIS OF RIGHT SHOULDER: Primary | ICD-10-CM

## 2018-08-13 DIAGNOSIS — M75.42 IMPINGEMENT SYNDROME OF LEFT SHOULDER: ICD-10-CM

## 2018-08-13 DIAGNOSIS — M25.611 DECREASED ROM OF RIGHT SHOULDER: ICD-10-CM

## 2018-08-13 PROCEDURE — 97112 NEUROMUSCULAR REEDUCATION: CPT | Performed by: PHYSICAL THERAPIST

## 2018-08-13 PROCEDURE — 97164 PT RE-EVAL EST PLAN CARE: CPT | Performed by: PHYSICAL THERAPIST

## 2018-08-13 PROCEDURE — 97110 THERAPEUTIC EXERCISES: CPT | Performed by: PHYSICAL THERAPIST

## 2018-08-13 PROCEDURE — 97530 THERAPEUTIC ACTIVITIES: CPT | Performed by: PHYSICAL THERAPIST

## 2018-08-13 NOTE — PLAN OF CARE
Status (): At least 40 percent but less than 60 percent impaired, limited or restricted  Carrying, Moving and Handling Objects Goal Status (): At least 1 percent but less than 20 percent impaired, limited or restricted    Progress Note: []  Yes  [x]  No  Next due by: 9/13/18      Latex Allergy:  [x]NO      []YES  Preferred Language for Healthcare:   [x]English       []other:    Visit # Insurance Allowable   10 40     Pain level:   2/10     SUBJECTIVE:  Patient reports that she saw Dr. Piper Alcala last week for a follow up appointment for the R shoulder and he is very pleased with her progress and that she is ready to progress to strengthening on that side. She notes that he assessed her L shoulder and feels she has developed tendinitis in the L shoulder and would like her to do some formal PT on the L shoulder as well.     OBJECTIVE: See eval             ROM PROM AROM  Comment:  8/13/18     L R L R     Flexion     160° 145°     Abduction     170° 162°     ER     T4 T2     IR     T3 T6     Other             Other                    Strength L R Comment:  8/13/18   Flexion 4/5 4/5     Abduction 5/5 4-/5     ER 5/5 4+/5     IR 4-/5 4/5           Special Tests Results/Comment:  8/13/18   Hortensia-Bret Positive on L   Neers Negative on L   OBriens Positive on L   Other: empty can Negative on L          RESTRICTIONS/PRECAUTIONS: R shoulder adhesive capsulitis    Exercises:  Exercise/Equipment Resistance/Repetitions Comments   Cardio/Warm-up     Bike          Stretching/PROM     Wand 10x10\" At 0° abd, Bilateral   Table Slides 10x10\" Bilateral   Supine wand flexion  10x10\"    Pulleys 6'    Pendulum      BBIR 10x10\"          Biceps stretch 10x10\" W/ cane, Bilateral   Doorway pec stretch 10x10\" Bilateral   Wall slides 10x10\" Bilateral   Table walk back 10x10\" Bilateral        STRENGTH     Isometrics     Retraction 20x5\"         Weight shift     Flexion     Abduction     External Rotation     Internal Rotation Biceps     Triceps          PRE's     Flexion     Abduction     External Rotation     Internal Rotation     Shrugs     EXT     Reverse Flys     Serratus     Horizontal Abd with ER     Biceps     Triceps     Retraction          Cable Column/Theraband     External Rotation Green, 2x10 R only   Internal Rotation Blue, 2x10 R only   Shrugs     Lats     Ext Blue, 2x10    Flex     Scapular Retraction Blue, 2x10    BIC     TRIC     PNF          Neuromuscular Re-ed     Dynamic Stability          Ball on wall 30x2 CW/CCW                  [place]                    Manual/Modalities       CP to R shoulder x10'     Manual PROM of R shoulders in all planes, 10'               Therapeutic Exercise and NMR EXR  [x] (92358) Provided verbal/tactile cueing for activities related to strengthening, flexibility, endurance, ROM  for improvements in scapular, scapulothoracic and UE control with self care, reaching, carrying, lifting, house/yardwork, driving/computer work. [x] (68057) Provided verbal/tactile cueing for activities related to improving balance, coordination, kinesthetic sense, posture, motor skill, proprioception  to assist with  scapular, scapulothoracic and UE control with self care, reaching, carrying, lifting, house/yardwork, driving/computer work. Therapeutic Activities:    [] (90518 or 07266) Provided verbal/tactile cueing for activities related to improving balance, coordination, kinesthetic sense, posture, motor skill, proprioception and motor activation to allow for proper function of scapular, scapulothoracic and UE control with self care, carrying, lifting, driving/computer work.      Home Exercise Program:    [x] (22850) Reviewed/Progressed HEP activities related to strengthening, flexibility, endurance, ROM of scapular, scapulothoracic and UE control with self care, reaching, carrying, lifting, house/yardwork, driving/computer work  [] (53450) Reviewed/Progressed HEP activities related to improving balance, coordination, kinesthetic sense, posture, motor skill, proprioception of scapular, scapulothoracic and UE control with self care, reaching, carrying, lifting, house/yardwork, driving/computer work      Manual Treatments:  PROM / STM / Oscillations-Mobs:  G-I, II, III, IV (PA's, Inf., Post.)  [] (00058) Provided manual therapy to mobilize soft tissue/joints of cervical/CT, scapular GHJ and UE for the purpose of modulating pain, promoting relaxation,  increasing ROM, reducing/eliminating soft tissue swelling/inflammation/restriction, improving soft tissue extensibility and allowing for proper ROM for normal function with self care, reaching, carrying, lifting, house/yardwork, driving/computer work    Modalities:  CP to L shoulder x10'    Charges:  Timed Code Treatment Minutes: 60   Total Treatment Minutes: 95     [] EVAL (LOW) 70152 (typically 20 minutes face-to-face)  [] EVAL (MOD) 46464 (typically 30 minutes face-to-face)  [] EVAL (HIGH) 22700 (typically 45 minutes face-to-face)  [x] RE-EVAL   [x] VH(54077) x  1   [] IONTO  [x] NMR (12851) x  1   [] VASO  [] Manual (28016) x       [] Other:  [x] TA (58420) x  2    [] Mech Traction (72851)  [] ES(attended) (35784)      [] ES (un) (58798):     GOALS:  Patient stated goal: Able to play golf without increased pain in the R shoulder. Therapist goals for Patient:   Short Term Goals: To be achieved in: 2 weeks  1. Independent in HEP and progression per patient tolerance, in order to prevent re-injury. MET  2. Patient will have a decrease in pain to facilitate improvement in movement, function, and ADLs as indicated by Functional Deficits. MET    Long Term Goals: To be achieved in: 6 weeks  1. Disability index score of 10% or less for the UEFI to assist with reaching prior level of function. ONGOING  2.  Patient will demonstrate increased R shoulder flexion, abduction, ER, and IR AROM to >160°, >160°, T3, and >T8 respectively, to allow for proper joint functioning as indicated by patients Functional Deficits. ONGOING   3. Patient will demonstrate an increase in R shoulder strength in all planes by a half grade or greater to allow for proper functional mobility as indicated by patients Functional Deficits. ONGOING  4. Patient will return to all functional activities without increased symptoms or restriction. 5. Patient will be able to complete bathing and dressing activities with R UE without increased pain in the R shoulder so that she may return to PLOF for all ADLs. ONGOING  6. Patient will be able to play 9 holes of golf without increased pain in the R shoulder so that she may return to PLOF. ONGOING     Progression Towards Functional goals:  [x] Patient is progressing as expected towards functional goals listed. [] Progression is slowed due to complexities listed. [] Progression has been slowed due to co-morbidities. [] Plan just implemented, too soon to assess goals progression  [] Other:     ASSESSMENT:  Patient exhibits signs and symptoms of impingement and tendinitis in the L shoulder. She tolerated gentle stretching activities for the L shoulder well today, with no reports of increased pain in the L shoulder. She is able to tolerate increased resistance for TB shoulder strengthening activities well, with no reports of pain, only fatigue in the R shoulder. Plan to progress R shoulder strengthening, and L shoulder mobility and strengthening per patient tolerance and MD recommendations.     Treatment/Activity Tolerance:  [x] Patient tolerated treatment well [] Patient limited by fatique  [] Patient limited by pain  [] Patient limited by other medical complications  [] Other:     Prognosis: [x] Good [] Fair  [] Poor    Patient Requires Follow-up: [x] Yes  [] No    PLAN: 1-2x/week for 4-6 weeks  [x] Continue per plan of care [] Alter current plan (see comments)  [] Plan of care initiated [] Hold pending MD visit [] Discharge    Electronically signed

## 2018-08-21 ENCOUNTER — TREATMENT (OUTPATIENT)
Dept: PHYSICAL THERAPY | Age: 53
End: 2018-08-21

## 2018-08-21 DIAGNOSIS — M25.511 PAIN IN JOINT OF RIGHT SHOULDER: ICD-10-CM

## 2018-08-21 DIAGNOSIS — M25.611 DECREASED ROM OF RIGHT SHOULDER: ICD-10-CM

## 2018-08-21 DIAGNOSIS — M75.01 ADHESIVE CAPSULITIS OF RIGHT SHOULDER: Primary | ICD-10-CM

## 2018-08-21 DIAGNOSIS — M25.512 LEFT SHOULDER PAIN, UNSPECIFIED CHRONICITY: ICD-10-CM

## 2018-08-21 DIAGNOSIS — M75.42 IMPINGEMENT SYNDROME OF LEFT SHOULDER: ICD-10-CM

## 2018-08-21 PROCEDURE — 97110 THERAPEUTIC EXERCISES: CPT | Performed by: PHYSICAL THERAPIST

## 2018-08-21 PROCEDURE — 97530 THERAPEUTIC ACTIVITIES: CPT | Performed by: PHYSICAL THERAPIST

## 2018-08-21 PROCEDURE — 97140 MANUAL THERAPY 1/> REGIONS: CPT | Performed by: PHYSICAL THERAPIST

## 2018-08-21 NOTE — FLOWSHEET NOTE
[facility]   Phone: [phone]    Fax: [phone]       Physical Therapy Daily Treatment Note  Date:  2018    Patient Name:  Xavier Noble  \"Bell\"  :  1965  MRN: [id number]  Medical/Treatment Diagnosis Information:  · Diagnosis: R shoulder adhesive capsulitis     Insurance/Certification information:  PT Insurance Information: [facility] [place]  Physician Information:  Referring Practitioner: Dr. Leah Suresh of care signed (Y/N): yes 18    Date of Patient follow up with Physician: 18    G-Code (if applicable):      Date G-Code Applied:  7/3/18  PT G-Codes  Functional Assessment Tool Used: UEFI  Score: 40/80, 50% limitation  Functional Limitation: Carrying, moving and handling objects  Carrying, Moving and Handling Objects Current Status (): At least 40 percent but less than 60 percent impaired, limited or restricted  Carrying, Moving and Handling Objects Goal Status (): At least 1 percent but less than 20 percent impaired, limited or restricted    Progress Note: []  Yes  [x]  No  Next due by: 18      Latex Allergy:  [x]NO      []YES  Preferred Language for Healthcare:   [x]English       []other:    Visit # Insurance Allowable   11 40     Pain level:   2/10     SUBJECTIVE:  Patient reports that both shoulders are feeling better. She notes that she was able to vacuum her whole house yesterday (~2 hours), with minimal soreness noted in R triceps.     OBJECTIVE: See eval             ROM PROM AROM  Comment:  18     L R L R     Flexion     160° 145°     Abduction     170° 162°     ER     T4 T2     IR     T3 T6     Other             Other                    Strength L R Comment:  18   Flexion 4/5 4/5     Abduction 5/5 4-/5     ER 5/5 4+/5     IR 4-/5 4/5           Special Tests Results/Comment:  18   Stephane Positive on L   Neers Negative on L   OBriens Positive on L   Other: empty can Negative on L        RESTRICTIONS/PRECAUTIONS: R shoulder adhesive capsulitis    Exercises:  Exercise/Equipment Resistance/Repetitions Comments   Cardio/Warm-up     Bike          Stretching/PROM     Wand 10x10\" At 0° abd, Bilateral   Table Slides 10x10\" Bilateral   Supine wand flexion  10x10\"    Pulleys 6'    Pendulum      BBIR 10x10\"          Biceps stretch 10x10\" W/ cane, Bilateral   Doorway pec stretch 10x10\" Bilateral   Wall slides 10x10\" Bilateral   Table walk back 10x10\" Bilateral        STRENGTH     Isometrics     Retraction 20x5\"         Weight shift     Flexion     Abduction     External Rotation     Internal Rotation     Biceps     Triceps          PRE's     Flexion     Abduction     External Rotation     Internal Rotation     Shrugs     EXT     Reverse Flys     Serratus     Horizontal Abd with ER     Biceps     Triceps     Retraction          Cable Column/Theraband     External Rotation Green, 2x10 R only   Internal Rotation Blue, 2x10 R only   Shrugs     Lats     Ext Blue, 2x10    Flex     Scapular Retraction Blue, 2x10    BIC     TRIC     PNF          Neuromuscular Re-ed     Dynamic Stability          Ball on wall 30x2 CW/CCW, bilaterally                  Plyoback                    Manual/Modalities       CP to R shoulder x10'     Manual PROM of B shoulders in all planes, 10'               Therapeutic Exercise and NMR EXR  [x] (41456) Provided verbal/tactile cueing for activities related to strengthening, flexibility, endurance, ROM  for improvements in scapular, scapulothoracic and UE control with self care, reaching, carrying, lifting, house/yardwork, driving/computer work. [x] (95424) Provided verbal/tactile cueing for activities related to improving balance, coordination, kinesthetic sense, posture, motor skill, proprioception  to assist with  scapular, scapulothoracic and UE control with self care, reaching, carrying, lifting, house/yardwork, driving/computer work.     Therapeutic Activities:    [] (41795 or ) Provided verbal/tactile cueing for activities related to improving balance, coordination, kinesthetic sense, posture, motor skill, proprioception and motor activation to allow for proper function of scapular, scapulothoracic and UE control with self care, carrying, lifting, driving/computer work.      Home Exercise Program:    [x] (39115) Reviewed/Progressed HEP activities related to strengthening, flexibility, endurance, ROM of scapular, scapulothoracic and UE control with self care, reaching, carrying, lifting, house/yardwork, driving/computer work  [] (35065) Reviewed/Progressed HEP activities related to improving balance, coordination, kinesthetic sense, posture, motor skill, proprioception of scapular, scapulothoracic and UE control with self care, reaching, carrying, lifting, house/yardwork, driving/computer work      Manual Treatments:  PROM / STM / Oscillations-Mobs:  G-I, II, III, IV (PA's, Inf., Post.)  [] (93268) Provided manual therapy to mobilize soft tissue/joints of cervical/CT, scapular GHJ and UE for the purpose of modulating pain, promoting relaxation,  increasing ROM, reducing/eliminating soft tissue swelling/inflammation/restriction, improving soft tissue extensibility and allowing for proper ROM for normal function with self care, reaching, carrying, lifting, house/yardwork, driving/computer work    Modalities:  CP to L shoulder x10'    Charges:  Timed Code Treatment Minutes: 40   Total Treatment Minutes: 62     [] EVAL (LOW) 03941 (typically 20 minutes face-to-face)  [] EVAL (MOD) 84384 (typically 30 minutes face-to-face)  [] EVAL (HIGH) 99387 (typically 45 minutes face-to-face)  [] RE-EVAL   [x] LA(78479) x  1   [] IONTO  [] NMR (98433) x      [] VASO  [x] Manual (82778) x  1    [] Other:  [x] TA (08978) x  1    [] Mech Traction (28280)  [] ES(attended) (82131)      [] ES (un) (45510):     GOALS:  Patient stated goal: Able to play golf without increased pain in the R

## 2018-08-29 ENCOUNTER — TREATMENT (OUTPATIENT)
Dept: PHYSICAL THERAPY | Age: 53
End: 2018-08-29

## 2018-08-29 DIAGNOSIS — M75.42 IMPINGEMENT SYNDROME OF LEFT SHOULDER: ICD-10-CM

## 2018-08-29 DIAGNOSIS — M75.01 ADHESIVE CAPSULITIS OF RIGHT SHOULDER: Primary | ICD-10-CM

## 2018-08-29 DIAGNOSIS — M25.512 LEFT SHOULDER PAIN, UNSPECIFIED CHRONICITY: ICD-10-CM

## 2018-08-29 DIAGNOSIS — M25.511 PAIN IN JOINT OF RIGHT SHOULDER: ICD-10-CM

## 2018-08-29 DIAGNOSIS — M25.611 DECREASED ROM OF RIGHT SHOULDER: ICD-10-CM

## 2018-08-29 PROCEDURE — 97140 MANUAL THERAPY 1/> REGIONS: CPT | Performed by: PHYSICAL THERAPIST

## 2018-08-29 PROCEDURE — 97110 THERAPEUTIC EXERCISES: CPT | Performed by: PHYSICAL THERAPIST

## 2018-08-29 PROCEDURE — 97530 THERAPEUTIC ACTIVITIES: CPT | Performed by: PHYSICAL THERAPIST

## 2018-08-29 NOTE — FLOWSHEET NOTE
RESTRICTIONS/PRECAUTIONS: R shoulder adhesive capsulitis    Exercises:  Exercise/Equipment Resistance/Repetitions Comments   Cardio/Warm-up     Bike          Stretching/PROM     Wand 10x10\" At 0° abd, Bilateral   Table Slides 10x10\" Bilateral   Supine wand flexion  10x10\"    Pulleys 6'    Pendulum      BBIR 10x10\"          Biceps stretch 10x10\" W/ cane, Bilateral   Doorway pec stretch 10x10\" Bilateral   Wall slides 10x10\" Bilateral   Table walk back 10x10\" Bilateral        STRENGTH     Isometrics     Retraction 20x5\"         Weight shift     Flexion     Abduction     External Rotation     Internal Rotation     Biceps     Triceps          PRE's     Flexion     Abduction     External Rotation     Internal Rotation     Shrugs     EXT     Reverse Flys     Serratus     Horizontal Abd with ER     Biceps     Triceps     Retraction          Cable Column/Theraband     External Rotation Green (R), Red (L),  2x10 bilaterally   Internal Rotation Blue (R), Green (L), 2x10 bilaterally   Shrugs     Lats     Ext Blue, 2x10    Flex     Scapular Retraction Blue, 2x10    BIC     TRIC     PNF          Neuromuscular Re-ed     Dynamic Stability          Ball on wall 30x2 CW/CCW, bilaterally                  Plyoback                    Manual/Modalities                           Therapeutic Exercise and NMR EXR  [x] (83943) Provided verbal/tactile cueing for activities related to strengthening, flexibility, endurance, ROM  for improvements in scapular, scapulothoracic and UE control with self care, reaching, carrying, lifting, house/yardwork, driving/computer work. [x] (41963) Provided verbal/tactile cueing for activities related to improving balance, coordination, kinesthetic sense, posture, motor skill, proprioception  to assist with  scapular, scapulothoracic and UE control with self care, reaching, carrying, lifting, house/yardwork, driving/computer work.     Therapeutic Activities:    [] (05355 or 71908) Provided Goals: To be achieved in: 2 weeks  1. Independent in HEP and progression per patient tolerance, in order to prevent re-injury. MET  2. Patient will have a decrease in pain to facilitate improvement in movement, function, and ADLs as indicated by Functional Deficits. MET    Long Term Goals: To be achieved in: 6 weeks  1. Disability index score of 10% or less for the UEFI to assist with reaching prior level of function. ONGOING  2. Patient will demonstrate increased R shoulder flexion, abduction, ER, and IR AROM to >160°, >160°, T3, and >T8 respectively, to allow for proper joint functioning as indicated by patients Functional Deficits. ONGOING   3. Patient will demonstrate an increase in R shoulder strength in all planes by a half grade or greater to allow for proper functional mobility as indicated by patients Functional Deficits. ONGOING  4. Patient will return to all functional activities without increased symptoms or restriction. 5. Patient will be able to complete bathing and dressing activities with R UE without increased pain in the R shoulder so that she may return to PLOF for all ADLs. ONGOING  6. Patient will be able to play 9 holes of golf without increased pain in the R shoulder so that she may return to PLOF. ONGOING     Progression Towards Functional goals:  [x] Patient is progressing as expected towards functional goals listed. [] Progression is slowed due to complexities listed. [] Progression has been slowed due to co-morbidities. [] Plan just implemented, too soon to assess goals progression  [] Other:     ASSESSMENT:  Patient demonstrates significant fatigue with introduction of TB ER with the L UE today. She requires minimal VCs to adjust form on doorway pec stretch. PT and patient discussed that we will try a longer period in between now and NPV to try assess ability to transition to a HEP fully.     Treatment/Activity Tolerance:  [x] Patient tolerated treatment well [] Patient limited by

## 2018-09-12 ENCOUNTER — TREATMENT (OUTPATIENT)
Dept: PHYSICAL THERAPY | Age: 53
End: 2018-09-12

## 2018-09-12 DIAGNOSIS — M75.42 IMPINGEMENT SYNDROME OF LEFT SHOULDER: ICD-10-CM

## 2018-09-12 DIAGNOSIS — M25.611 DECREASED ROM OF RIGHT SHOULDER: ICD-10-CM

## 2018-09-12 DIAGNOSIS — M75.01 ADHESIVE CAPSULITIS OF RIGHT SHOULDER: Primary | ICD-10-CM

## 2018-09-12 DIAGNOSIS — M25.512 LEFT SHOULDER PAIN, UNSPECIFIED CHRONICITY: ICD-10-CM

## 2018-09-12 DIAGNOSIS — M25.511 PAIN IN JOINT OF RIGHT SHOULDER: ICD-10-CM

## 2018-09-12 PROCEDURE — 97530 THERAPEUTIC ACTIVITIES: CPT | Performed by: PHYSICAL THERAPIST

## 2018-09-12 PROCEDURE — 97140 MANUAL THERAPY 1/> REGIONS: CPT | Performed by: PHYSICAL THERAPIST

## 2018-09-12 PROCEDURE — 97110 THERAPEUTIC EXERCISES: CPT | Performed by: PHYSICAL THERAPIST

## 2018-09-12 NOTE — PLAN OF CARE
activities without increased symptoms or restriction. 5. Patient will be able to complete bathing and dressing activities with R UE without increased pain in the R shoulder so that she may return to PLOF for all ADLs. ONGOING  6. Patient will be able to play 9 holes of golf without increased pain in the R shoulder so that she may return to PLOF. ONGOING     Progression Towards Functional goals:  [x] Patient is progressing as expected towards functional goals listed. [] Progression is slowed due to complexities listed. [] Progression has been slowed due to co-morbidities. [] Plan just implemented, too soon to assess goals progression  [] Other:     ASSESSMENT:  Patient continues to demonstrate good progress toward all LTGs previously set by PT. She continues to report tightness, pulling, and discomfort in the L pec region. PT instructed patient to follow up about this pain with MD at tomorrow's follow up appointment. Patient would benefit from continued skilled PT services to address remaining deficits in B shoulder ROM and function with intention to transition to a HEP.     Treatment/Activity Tolerance:  [x] Patient tolerated treatment well [] Patient limited by fatique  [] Patient limited by pain  [] Patient limited by other medical complications  [] Other:     Prognosis: [x] Good [] Fair  [] Poor    Patient Requires Follow-up: [x] Yes  [] No    PLAN: 1-2x/week for 4-6 weeks  [x] Continue per plan of care [] Alter current plan (see comments)  [] Plan of care initiated [] Hold pending MD visit [] Discharge    Electronically signed by:      [facility] license: [id number]  [id number] license: [id number]     Janine Carrington PT, DPT

## 2018-09-20 ENCOUNTER — OFFICE VISIT (OUTPATIENT)
Dept: ORTHOPEDIC SURGERY | Age: 53
End: 2018-09-20

## 2018-09-20 VITALS
HEIGHT: 66 IN | WEIGHT: 120 LBS | HEART RATE: 75 BPM | BODY MASS INDEX: 19.29 KG/M2 | SYSTOLIC BLOOD PRESSURE: 109 MMHG | DIASTOLIC BLOOD PRESSURE: 68 MMHG

## 2018-09-20 DIAGNOSIS — M75.01 ADHESIVE CAPSULITIS OF RIGHT SHOULDER: Primary | ICD-10-CM

## 2018-09-20 DIAGNOSIS — M25.511 RIGHT SHOULDER PAIN, UNSPECIFIED CHRONICITY: ICD-10-CM

## 2018-09-20 DIAGNOSIS — M75.42 SHOULDER IMPINGEMENT SYNDROME, LEFT: ICD-10-CM

## 2018-09-20 DIAGNOSIS — M25.512 LEFT SHOULDER PAIN, UNSPECIFIED CHRONICITY: ICD-10-CM

## 2018-09-20 PROCEDURE — 99213 OFFICE O/P EST LOW 20 MIN: CPT | Performed by: ORTHOPAEDIC SURGERY

## 2018-09-23 NOTE — PROGRESS NOTES
adduction is at 5 cm without obvious discomfort. Strength:  Negative belly press test.  Good external rotation strength at the side. Special Tests:  No Isai muscle deformity. Negative speed's test.    Assessment :  #1 left shoulder pain possibly related to a subscapularis injury or some biceps tendinitis despite the exam findings. A pectoralis strain is less likely. #2 resolving adhesive capsulitis right shoulder. Impression:  Encounter Diagnoses   Name Primary?  Adhesive capsulitis of right shoulder Yes    Left shoulder pain, unspecified chronicity     Shoulder impingement syndrome, left     Right shoulder pain, unspecified chronicity        Office Procedures:  Orders Placed This Encounter   Procedures    MRI Shoulder Left WO Contrast     Standing Status:   Future     Standing Expiration Date:   9/20/2019       Treatment Plan: We will obtain an MRI of the left shoulder to help with her diagnosis. I don't think he has a serious problem but the MRI may help us direct her physical therapy a little bit better. She should keep up with home exercises for her right shoulder. Follow-up will be in a week or 2 to go over the results of the left shoulder MRI. All questions were answered today. Aldo Edmonds MD, PhD  9/20/2018

## 2018-10-09 ENCOUNTER — OFFICE VISIT (OUTPATIENT)
Dept: ORTHOPEDIC SURGERY | Age: 53
End: 2018-10-09
Payer: COMMERCIAL

## 2018-10-09 VITALS
HEART RATE: 65 BPM | DIASTOLIC BLOOD PRESSURE: 76 MMHG | WEIGHT: 120 LBS | HEIGHT: 66 IN | BODY MASS INDEX: 19.29 KG/M2 | SYSTOLIC BLOOD PRESSURE: 119 MMHG

## 2018-10-09 DIAGNOSIS — M75.01 ADHESIVE CAPSULITIS OF RIGHT SHOULDER: Primary | ICD-10-CM

## 2018-10-09 DIAGNOSIS — M25.512 LEFT SHOULDER PAIN, UNSPECIFIED CHRONICITY: ICD-10-CM

## 2018-10-09 PROCEDURE — 99212 OFFICE O/P EST SF 10 MIN: CPT | Performed by: ORTHOPAEDIC SURGERY

## 2018-10-09 RX ORDER — CELECOXIB 200 MG/1
200 CAPSULE ORAL DAILY
Qty: 60 CAPSULE | Refills: 3 | Status: SHIPPED | OUTPATIENT
Start: 2018-10-09 | End: 2019-01-31

## 2018-10-24 ENCOUNTER — TREATMENT (OUTPATIENT)
Dept: PHYSICAL THERAPY | Age: 53
End: 2018-10-24
Payer: COMMERCIAL

## 2018-10-24 DIAGNOSIS — M75.42 IMPINGEMENT SYNDROME OF LEFT SHOULDER: ICD-10-CM

## 2018-10-24 DIAGNOSIS — M75.01 ADHESIVE CAPSULITIS OF RIGHT SHOULDER: Primary | ICD-10-CM

## 2018-10-24 DIAGNOSIS — M25.611 DECREASED ROM OF RIGHT SHOULDER: ICD-10-CM

## 2018-10-24 DIAGNOSIS — M25.512 LEFT SHOULDER PAIN, UNSPECIFIED CHRONICITY: ICD-10-CM

## 2018-10-24 DIAGNOSIS — M25.511 PAIN IN JOINT OF RIGHT SHOULDER: ICD-10-CM

## 2018-10-24 PROCEDURE — 97530 THERAPEUTIC ACTIVITIES: CPT | Performed by: PHYSICAL THERAPIST

## 2018-10-24 PROCEDURE — 97110 THERAPEUTIC EXERCISES: CPT | Performed by: PHYSICAL THERAPIST

## 2018-10-24 PROCEDURE — G8985 CARRY GOAL STATUS: HCPCS | Performed by: PHYSICAL THERAPIST

## 2018-10-24 PROCEDURE — G8984 CARRY CURRENT STATUS: HCPCS | Performed by: PHYSICAL THERAPIST

## 2018-10-24 PROCEDURE — 97112 NEUROMUSCULAR REEDUCATION: CPT | Performed by: PHYSICAL THERAPIST

## 2018-10-24 PROCEDURE — 97164 PT RE-EVAL EST PLAN CARE: CPT | Performed by: PHYSICAL THERAPIST

## 2018-10-24 NOTE — PLAN OF CARE
Flexion     Abduction     External Rotation     Internal Rotation     Shrugs     EXT     Reverse Flys     Serratus     Horizontal Abd with ER     Biceps     Triceps     Retraction          Cable Column/Theraband     bilaterally   bilaterally   Shrugs     Lats        Flex        BIC     TRIC Blue, 2x10 bilaterally   PNF          Neuromuscular Re-ed     Dynamic Stability                         Plyoback                    Manual/Modalities       CP to B shoulder x10'                    Therapeutic Exercise and NMR EXR  [x] (37308) Provided verbal/tactile cueing for activities related to strengthening, flexibility, endurance, ROM  for improvements in scapular, scapulothoracic and UE control with self care, reaching, carrying, lifting, house/yardwork, driving/computer work. [x] (47856) Provided verbal/tactile cueing for activities related to improving balance, coordination, kinesthetic sense, posture, motor skill, proprioception  to assist with  scapular, scapulothoracic and UE control with self care, reaching, carrying, lifting, house/yardwork, driving/computer work. Therapeutic Activities:    [] (40596 or 37955) Provided verbal/tactile cueing for activities related to improving balance, coordination, kinesthetic sense, posture, motor skill, proprioception and motor activation to allow for proper function of scapular, scapulothoracic and UE control with self care, carrying, lifting, driving/computer work.      Home Exercise Program:    [x] (46358) Reviewed/Progressed HEP activities related to strengthening, flexibility, endurance, ROM of scapular, scapulothoracic and UE control with self care, reaching, carrying, lifting, house/yardwork, driving/computer work  [] (27690) Reviewed/Progressed HEP activities related to improving balance, coordination, kinesthetic sense, posture, motor skill, proprioception of scapular, scapulothoracic and UE control with self care, reaching, carrying, lifting, house/yardwork,

## 2018-10-26 ENCOUNTER — TREATMENT (OUTPATIENT)
Dept: PHYSICAL THERAPY | Age: 53
End: 2018-10-26
Payer: COMMERCIAL

## 2018-10-26 DIAGNOSIS — M25.511 PAIN IN JOINT OF RIGHT SHOULDER: ICD-10-CM

## 2018-10-26 DIAGNOSIS — M75.01 ADHESIVE CAPSULITIS OF RIGHT SHOULDER: Primary | ICD-10-CM

## 2018-10-26 DIAGNOSIS — M75.42 IMPINGEMENT SYNDROME OF LEFT SHOULDER: ICD-10-CM

## 2018-10-26 DIAGNOSIS — M25.512 LEFT SHOULDER PAIN, UNSPECIFIED CHRONICITY: ICD-10-CM

## 2018-10-26 DIAGNOSIS — M25.611 DECREASED ROM OF RIGHT SHOULDER: ICD-10-CM

## 2018-10-26 PROCEDURE — 97140 MANUAL THERAPY 1/> REGIONS: CPT | Performed by: PHYSICAL THERAPIST

## 2018-10-26 PROCEDURE — 97110 THERAPEUTIC EXERCISES: CPT | Performed by: PHYSICAL THERAPIST

## 2018-10-26 PROCEDURE — 97530 THERAPEUTIC ACTIVITIES: CPT | Performed by: PHYSICAL THERAPIST

## 2018-10-26 NOTE — FLOWSHEET NOTE
allow for proper function of scapular, scapulothoracic and UE control with self care, carrying, lifting, driving/computer work. Home Exercise Program:    [x] (47220) Reviewed/Progressed HEP activities related to strengthening, flexibility, endurance, ROM of scapular, scapulothoracic and UE control with self care, reaching, carrying, lifting, house/yardwork, driving/computer work  [] (95932) Reviewed/Progressed HEP activities related to improving balance, coordination, kinesthetic sense, posture, motor skill, proprioception of scapular, scapulothoracic and UE control with self care, reaching, carrying, lifting, house/yardwork, driving/computer work      Manual Treatments:  PROM / STM / Oscillations-Mobs:  G-I, II, III, IV (PA's, Inf., Post.)  [] (01717) Provided manual therapy to mobilize soft tissue/joints of cervical/CT, scapular GHJ and UE for the purpose of modulating pain, promoting relaxation,  increasing ROM, reducing/eliminating soft tissue swelling/inflammation/restriction, improving soft tissue extensibility and allowing for proper ROM for normal function with self care, reaching, carrying, lifting, house/yardwork, driving/computer work    Modalities:  CP to B shoulder x10'    Charges:  Timed Code Treatment Minutes: 50   Total Treatment Minutes: 63     [] EVAL (LOW) 74921 (typically 20 minutes face-to-face)  [] EVAL (MOD) 90955 (typically 30 minutes face-to-face)  [] EVAL (HIGH) 13736 (typically 45 minutes face-to-face)  [] RE-EVAL   [x] LJ(22071) x  1   [] IONTO  [] NMR (31084) x      [] VASO  [x] Manual (57429) x  1    [] Other:  [x] TA (28551) x  1    [] [facility] (22957[facility]  [] ES(attended) (77179)      [] ES (un) (89236):     GOALS:  Patient stated goal: Able to play golf without increased pain in the R shoulder. Therapist goals for Patient:   Short Term Goals: To be achieved in: 2 weeks  1. Independent in HEP and progression per patient tolerance, in order to prevent re-injury. MET  2.  Patient

## 2018-10-30 ENCOUNTER — TREATMENT (OUTPATIENT)
Dept: PHYSICAL THERAPY | Age: 53
End: 2018-10-30
Payer: COMMERCIAL

## 2018-10-30 DIAGNOSIS — M75.42 IMPINGEMENT SYNDROME OF LEFT SHOULDER: ICD-10-CM

## 2018-10-30 DIAGNOSIS — M25.611 DECREASED ROM OF RIGHT SHOULDER: ICD-10-CM

## 2018-10-30 DIAGNOSIS — M75.01 ADHESIVE CAPSULITIS OF RIGHT SHOULDER: Primary | ICD-10-CM

## 2018-10-30 DIAGNOSIS — M25.511 PAIN IN JOINT OF RIGHT SHOULDER: ICD-10-CM

## 2018-10-30 DIAGNOSIS — M25.512 LEFT SHOULDER PAIN, UNSPECIFIED CHRONICITY: ICD-10-CM

## 2018-10-30 PROCEDURE — 97530 THERAPEUTIC ACTIVITIES: CPT | Performed by: PHYSICAL THERAPIST

## 2018-10-30 PROCEDURE — 97140 MANUAL THERAPY 1/> REGIONS: CPT | Performed by: PHYSICAL THERAPIST

## 2018-10-30 PROCEDURE — 97110 THERAPEUTIC EXERCISES: CPT | Performed by: PHYSICAL THERAPIST

## 2018-10-30 NOTE — FLOWSHEET NOTE
will have a decrease in pain to facilitate improvement in movement, function, and ADLs as indicated by Functional Deficits. MET    Long Term Goals: To be achieved in: 6 weeks  1. Disability index score of 10% or less for the UEFI to assist with reaching prior level of function. ONGOING  2. Patient will demonstrate increased R shoulder flexion, abduction, ER, and IR AROM to >160°, >160°, T3, and >T8 respectively, to allow for proper joint functioning as indicated by patients Functional Deficits. ONGOING   3. Patient will demonstrate an increase in R shoulder strength in all planes by a half grade or greater to allow for proper functional mobility as indicated by patients Functional Deficits. ONGOING  4. Patient will return to all functional activities without increased symptoms or restriction. 5. Patient will be able to complete bathing and dressing activities with R UE without increased pain in the R shoulder so that she may return to PLOF for all ADLs. ONGOING  6. Patient will be able to play 9 holes of golf without increased pain in the R shoulder so that she may return to PLOF. ONGOING     Progression Towards Functional goals:  [x] Patient is progressing as expected towards functional goals listed. [] Progression is slowed due to complexities listed. [] Progression has been slowed due to co-morbidities. [] Plan just implemented, too soon to assess goals progression  [] Other:     ASSESSMENT:  Patient continues to demonstrate significant TTP noted during manual STM by PT today. She reports significant decrease in pain in B shoulders following manual treatment today. PT instructed patient to take 1-2 day break from TB activities at home to allow for recovery, and resume as tolerated after that time.     Treatment/Activity Tolerance:  [x] Patient tolerated treatment well [] Patient limited by fatique  [] Patient limited by pain  [] Patient limited by other medical complications  [] Other:     Prognosis: [x]

## 2018-11-02 ENCOUNTER — TREATMENT (OUTPATIENT)
Dept: PHYSICAL THERAPY | Age: 53
End: 2018-11-02
Payer: COMMERCIAL

## 2018-11-02 DIAGNOSIS — M75.01 ADHESIVE CAPSULITIS OF RIGHT SHOULDER: Primary | ICD-10-CM

## 2018-11-02 DIAGNOSIS — M25.512 LEFT SHOULDER PAIN, UNSPECIFIED CHRONICITY: ICD-10-CM

## 2018-11-02 DIAGNOSIS — M75.42 IMPINGEMENT SYNDROME OF LEFT SHOULDER: ICD-10-CM

## 2018-11-02 DIAGNOSIS — M25.511 PAIN IN JOINT OF RIGHT SHOULDER: ICD-10-CM

## 2018-11-02 DIAGNOSIS — M25.611 DECREASED ROM OF RIGHT SHOULDER: ICD-10-CM

## 2018-11-02 PROCEDURE — 97530 THERAPEUTIC ACTIVITIES: CPT | Performed by: PHYSICAL THERAPIST

## 2018-11-02 PROCEDURE — 97140 MANUAL THERAPY 1/> REGIONS: CPT | Performed by: PHYSICAL THERAPIST

## 2018-11-02 PROCEDURE — 97110 THERAPEUTIC EXERCISES: CPT | Performed by: PHYSICAL THERAPIST

## 2018-11-05 ENCOUNTER — TREATMENT (OUTPATIENT)
Dept: PHYSICAL THERAPY | Age: 53
End: 2018-11-05
Payer: COMMERCIAL

## 2018-11-05 DIAGNOSIS — M75.01 ADHESIVE CAPSULITIS OF RIGHT SHOULDER: Primary | ICD-10-CM

## 2018-11-05 DIAGNOSIS — M25.511 PAIN IN JOINT OF RIGHT SHOULDER: ICD-10-CM

## 2018-11-05 DIAGNOSIS — M75.42 IMPINGEMENT SYNDROME OF LEFT SHOULDER: ICD-10-CM

## 2018-11-05 DIAGNOSIS — M25.512 LEFT SHOULDER PAIN, UNSPECIFIED CHRONICITY: ICD-10-CM

## 2018-11-05 DIAGNOSIS — M25.611 DECREASED ROM OF RIGHT SHOULDER: ICD-10-CM

## 2018-11-05 PROCEDURE — 97140 MANUAL THERAPY 1/> REGIONS: CPT | Performed by: PHYSICAL THERAPIST

## 2018-11-05 PROCEDURE — 97530 THERAPEUTIC ACTIVITIES: CPT | Performed by: PHYSICAL THERAPIST

## 2018-11-05 PROCEDURE — 97110 THERAPEUTIC EXERCISES: CPT | Performed by: PHYSICAL THERAPIST

## 2018-11-05 NOTE — FLOWSHEET NOTE
tolerance, in order to prevent re-injury. MET  2. Patient will have a decrease in pain to facilitate improvement in movement, function, and ADLs as indicated by Functional Deficits. MET    Long Term Goals: To be achieved in: 6 weeks  1. Disability index score of 10% or less for the UEFI to assist with reaching prior level of function. ONGOING  2. Patient will demonstrate increased R shoulder flexion, abduction, ER, and IR AROM to >160°, >160°, T3, and >T8 respectively, to allow for proper joint functioning as indicated by patients Functional Deficits. ONGOING   3. Patient will demonstrate an increase in R shoulder strength in all planes by a half grade or greater to allow for proper functional mobility as indicated by patients Functional Deficits. ONGOING  4. Patient will return to all functional activities without increased symptoms or restriction. 5. Patient will be able to complete bathing and dressing activities with R UE without increased pain in the R shoulder so that she may return to PLOF for all ADLs. ONGOING  6. Patient will be able to play 9 holes of golf without increased pain in the R shoulder so that she may return to PLOF. ONGOING     Progression Towards Functional goals:  [x] Patient is progressing as expected towards functional goals listed. [] Progression is slowed due to complexities listed. [] Progression has been slowed due to co-morbidities. [] Plan just implemented, too soon to assess goals progression  [] Other:     ASSESSMENT:  Patient exhibits decreased TTP in R biceps today, noted during manual STM by PT, though significant muscle tightness remains present. She requires frequent cuing and encouragement throughout session to maintain proper form, and to assure her that things are improving. Plan to progress activities per patient tolerance.     Treatment/Activity Tolerance:  [x] Patient tolerated treatment well [] Patient limited by fatique  [] Patient limited by pain  [] Patient

## 2018-11-07 ENCOUNTER — TREATMENT (OUTPATIENT)
Dept: PHYSICAL THERAPY | Age: 53
End: 2018-11-07
Payer: COMMERCIAL

## 2018-11-07 DIAGNOSIS — M75.42 IMPINGEMENT SYNDROME OF LEFT SHOULDER: ICD-10-CM

## 2018-11-07 DIAGNOSIS — M75.01 ADHESIVE CAPSULITIS OF RIGHT SHOULDER: Primary | ICD-10-CM

## 2018-11-07 DIAGNOSIS — M25.511 PAIN IN JOINT OF RIGHT SHOULDER: ICD-10-CM

## 2018-11-07 DIAGNOSIS — M25.512 LEFT SHOULDER PAIN, UNSPECIFIED CHRONICITY: ICD-10-CM

## 2018-11-07 DIAGNOSIS — M25.611 DECREASED ROM OF RIGHT SHOULDER: ICD-10-CM

## 2018-11-07 PROCEDURE — 97530 THERAPEUTIC ACTIVITIES: CPT | Performed by: PHYSICAL THERAPIST

## 2018-11-07 PROCEDURE — 97140 MANUAL THERAPY 1/> REGIONS: CPT | Performed by: PHYSICAL THERAPIST

## 2018-11-07 PROCEDURE — 97110 THERAPEUTIC EXERCISES: CPT | Performed by: PHYSICAL THERAPIST

## 2018-11-07 NOTE — FLOWSHEET NOTE
order to prevent re-injury. MET  2. Patient will have a decrease in pain to facilitate improvement in movement, function, and ADLs as indicated by Functional Deficits. MET    Long Term Goals: To be achieved in: 6 weeks  1. Disability index score of 10% or less for the UEFI to assist with reaching prior level of function. ONGOING  2. Patient will demonstrate increased R shoulder flexion, abduction, ER, and IR AROM to >160°, >160°, T3, and >T8 respectively, to allow for proper joint functioning as indicated by patients Functional Deficits. ONGOING   3. Patient will demonstrate an increase in R shoulder strength in all planes by a half grade or greater to allow for proper functional mobility as indicated by patients Functional Deficits. ONGOING  4. Patient will return to all functional activities without increased symptoms or restriction. 5. Patient will be able to complete bathing and dressing activities with R UE without increased pain in the R shoulder so that she may return to PLOF for all ADLs. ONGOING  6. Patient will be able to play 9 holes of golf without increased pain in the R shoulder so that she may return to PLOF. ONGOING     Progression Towards Functional goals:  [x] Patient is progressing as expected towards functional goals listed. [] Progression is slowed due to complexities listed. [] Progression has been slowed due to co-morbidities. [] Plan just implemented, too soon to assess goals progression  [] Other:     ASSESSMENT:  Patient demonstrates positive response to supine cane flexion and table walk back stretches today, reporting decreased pain/soreness. Decreased palpable tightness is noted in R biceps today. Patient was instructed that if pain is not improving she should schedule a follow up appointment with Dr. Zach Lord. Patient will be seen following her trip to [facility].     Treatment/Activity Tolerance:  [x] Patient tolerated treatment well [] Patient limited by maurilio  [] Patient limited by

## 2018-11-19 ENCOUNTER — TREATMENT (OUTPATIENT)
Dept: PHYSICAL THERAPY | Age: 53
End: 2018-11-19
Payer: COMMERCIAL

## 2018-11-19 DIAGNOSIS — M75.01 ADHESIVE CAPSULITIS OF RIGHT SHOULDER: Primary | ICD-10-CM

## 2018-11-19 DIAGNOSIS — M25.511 PAIN IN JOINT OF RIGHT SHOULDER: ICD-10-CM

## 2018-11-19 DIAGNOSIS — M25.611 DECREASED ROM OF RIGHT SHOULDER: ICD-10-CM

## 2018-11-19 DIAGNOSIS — M25.512 LEFT SHOULDER PAIN, UNSPECIFIED CHRONICITY: ICD-10-CM

## 2018-11-19 DIAGNOSIS — M75.42 IMPINGEMENT SYNDROME OF LEFT SHOULDER: ICD-10-CM

## 2018-11-19 PROCEDURE — 97140 MANUAL THERAPY 1/> REGIONS: CPT | Performed by: PHYSICAL THERAPIST

## 2018-11-19 PROCEDURE — 97110 THERAPEUTIC EXERCISES: CPT | Performed by: PHYSICAL THERAPIST

## 2018-11-19 PROCEDURE — 97530 THERAPEUTIC ACTIVITIES: CPT | Performed by: PHYSICAL THERAPIST

## 2018-11-19 NOTE — FLOWSHEET NOTE
[facility]   Phone: [phone]    Fax: [phone]        Physical Therapy Daily Treatment Note  Date:  2018    Patient Name:  Ignacio Rosen  \"Bell\"  :  1965  MRN: [id number]  Medical/Treatment Diagnosis Information:  · Diagnosis: R shoulder adhesive capsulitis     Insurance/Certification information:  PT Insurance Information: Medical [place]  Physician Information:  Referring Practitioner: Dr. Ting Leal of care signed (Y/N): yes 10/24/18    Date of Patient follow up with Physician: 18    G-Code (if applicable):      Date G-Code Applied:  10/24/18  PT G-Codes  Functional Assessment Tool Used: UEFI  Score: 51/80, 36.25% limitation  Functional Limitation: Carrying, moving and handling objects  Carrying, Moving and Handling Objects Current Status (): At least 20 percent but less than 40 percent impaired, limited or restricted  Carrying, Moving and Handling Objects Goal Status (): At least 1 percent but less than 20 percent impaired, limited or restricted    Progress Note: []  Yes  [x]  No  Next due by: 18      Latex Allergy:  [x]NO      []YES  Preferred Language for Healthcare:   [x]English       []other:    Visit # Insurance Allowable   20 40     Pain level:   2/10     SUBJECTIVE:  Patient reports that she has had a very rough last 10 days on her trip to [place]. She notes that she has continued tightness and discomfort in B pecs and around to her lats.     OBJECTIVE: See eval             ROM PROM AROM  Comment:  10/24/18     L R L R     Flexion     165° 145°     Abduction     170° 160°     ER     T3 T3     IR     T3 T6     Other             Other                    Strength L R Comment:  10/24/18   Flexion 4/5* 4+/5     Abduction 4+/5* 4+/5     ER 5/5 4+/5     IR 4+/5 5/5           Special Tests Results/Comment:  10/24/18   Stephane Positive on L   Neers Negative on L   OBriens Positive on L   Other: empty can Negative on L

## 2018-11-21 ENCOUNTER — TREATMENT (OUTPATIENT)
Dept: PHYSICAL THERAPY | Age: 53
End: 2018-11-21
Payer: COMMERCIAL

## 2018-11-21 DIAGNOSIS — M75.42 IMPINGEMENT SYNDROME OF LEFT SHOULDER: ICD-10-CM

## 2018-11-21 DIAGNOSIS — M25.611 DECREASED ROM OF RIGHT SHOULDER: ICD-10-CM

## 2018-11-21 DIAGNOSIS — M25.512 LEFT SHOULDER PAIN, UNSPECIFIED CHRONICITY: ICD-10-CM

## 2018-11-21 DIAGNOSIS — M75.01 ADHESIVE CAPSULITIS OF RIGHT SHOULDER: Primary | ICD-10-CM

## 2018-11-21 DIAGNOSIS — M25.511 PAIN IN JOINT OF RIGHT SHOULDER: ICD-10-CM

## 2018-11-21 PROCEDURE — 97110 THERAPEUTIC EXERCISES: CPT | Performed by: PHYSICAL THERAPIST

## 2018-11-21 PROCEDURE — 97530 THERAPEUTIC ACTIVITIES: CPT | Performed by: PHYSICAL THERAPIST

## 2018-11-21 PROCEDURE — 97140 MANUAL THERAPY 1/> REGIONS: CPT | Performed by: PHYSICAL THERAPIST

## 2018-11-26 ENCOUNTER — TREATMENT (OUTPATIENT)
Dept: PHYSICAL THERAPY | Age: 53
End: 2018-11-26
Payer: COMMERCIAL

## 2018-11-26 DIAGNOSIS — M25.611 DECREASED ROM OF RIGHT SHOULDER: ICD-10-CM

## 2018-11-26 DIAGNOSIS — M25.511 PAIN IN JOINT OF RIGHT SHOULDER: ICD-10-CM

## 2018-11-26 DIAGNOSIS — M75.01 ADHESIVE CAPSULITIS OF RIGHT SHOULDER: Primary | ICD-10-CM

## 2018-11-26 DIAGNOSIS — M25.512 LEFT SHOULDER PAIN, UNSPECIFIED CHRONICITY: ICD-10-CM

## 2018-11-26 DIAGNOSIS — M75.42 IMPINGEMENT SYNDROME OF LEFT SHOULDER: ICD-10-CM

## 2018-11-26 PROCEDURE — 97110 THERAPEUTIC EXERCISES: CPT | Performed by: PHYSICAL THERAPIST

## 2018-11-26 PROCEDURE — 97530 THERAPEUTIC ACTIVITIES: CPT | Performed by: PHYSICAL THERAPIST

## 2018-11-26 PROCEDURE — 97140 MANUAL THERAPY 1/> REGIONS: CPT | Performed by: PHYSICAL THERAPIST

## 2018-11-26 NOTE — PLAN OF CARE
comments)  [] Plan of care initiated [] Hold pending MD visit [] Discharge    Electronically signed by:      Shanelle Palomo license: [id number]  [address] [facility] license: [id number]     Cody Early PT, DPT, OMT-C

## 2018-11-29 ENCOUNTER — OFFICE VISIT (OUTPATIENT)
Dept: ORTHOPEDIC SURGERY | Age: 53
End: 2018-11-29
Payer: COMMERCIAL

## 2018-11-29 VITALS
HEIGHT: 66 IN | WEIGHT: 119.93 LBS | DIASTOLIC BLOOD PRESSURE: 68 MMHG | BODY MASS INDEX: 19.27 KG/M2 | HEART RATE: 78 BPM | SYSTOLIC BLOOD PRESSURE: 108 MMHG

## 2018-11-29 DIAGNOSIS — M75.01 ADHESIVE CAPSULITIS OF RIGHT SHOULDER: Primary | ICD-10-CM

## 2018-11-29 PROCEDURE — 99213 OFFICE O/P EST LOW 20 MIN: CPT | Performed by: ORTHOPAEDIC SURGERY

## 2018-11-30 ENCOUNTER — TREATMENT (OUTPATIENT)
Dept: PHYSICAL THERAPY | Age: 53
End: 2018-11-30
Payer: COMMERCIAL

## 2018-11-30 DIAGNOSIS — M75.42 IMPINGEMENT SYNDROME OF LEFT SHOULDER: ICD-10-CM

## 2018-11-30 DIAGNOSIS — M25.611 DECREASED ROM OF RIGHT SHOULDER: ICD-10-CM

## 2018-11-30 DIAGNOSIS — M75.01 ADHESIVE CAPSULITIS OF RIGHT SHOULDER: Primary | ICD-10-CM

## 2018-11-30 DIAGNOSIS — M25.511 PAIN IN JOINT OF RIGHT SHOULDER: ICD-10-CM

## 2018-11-30 DIAGNOSIS — M25.512 LEFT SHOULDER PAIN, UNSPECIFIED CHRONICITY: ICD-10-CM

## 2018-11-30 PROCEDURE — 97110 THERAPEUTIC EXERCISES: CPT | Performed by: PHYSICAL THERAPIST

## 2018-11-30 PROCEDURE — 97530 THERAPEUTIC ACTIVITIES: CPT | Performed by: PHYSICAL THERAPIST

## 2018-11-30 PROCEDURE — 97112 NEUROMUSCULAR REEDUCATION: CPT | Performed by: PHYSICAL THERAPIST

## 2018-11-30 PROCEDURE — 97140 MANUAL THERAPY 1/> REGIONS: CPT | Performed by: PHYSICAL THERAPIST

## 2018-12-04 ENCOUNTER — TREATMENT (OUTPATIENT)
Dept: PHYSICAL THERAPY | Age: 53
End: 2018-12-04
Payer: COMMERCIAL

## 2018-12-04 DIAGNOSIS — M25.611 DECREASED ROM OF RIGHT SHOULDER: ICD-10-CM

## 2018-12-04 DIAGNOSIS — M75.42 IMPINGEMENT SYNDROME OF LEFT SHOULDER: ICD-10-CM

## 2018-12-04 DIAGNOSIS — M75.01 ADHESIVE CAPSULITIS OF RIGHT SHOULDER: Primary | ICD-10-CM

## 2018-12-04 DIAGNOSIS — M25.512 LEFT SHOULDER PAIN, UNSPECIFIED CHRONICITY: ICD-10-CM

## 2018-12-04 DIAGNOSIS — M25.511 PAIN IN JOINT OF RIGHT SHOULDER: ICD-10-CM

## 2018-12-04 PROCEDURE — 97112 NEUROMUSCULAR REEDUCATION: CPT | Performed by: PHYSICAL THERAPIST

## 2018-12-04 PROCEDURE — 97140 MANUAL THERAPY 1/> REGIONS: CPT | Performed by: PHYSICAL THERAPIST

## 2018-12-04 PROCEDURE — 97530 THERAPEUTIC ACTIVITIES: CPT | Performed by: PHYSICAL THERAPIST

## 2018-12-04 PROCEDURE — 97110 THERAPEUTIC EXERCISES: CPT | Performed by: PHYSICAL THERAPIST

## 2018-12-06 NOTE — PROGRESS NOTES
History of Present Illness:  Anila Oh returns for follow-up of her shoulders. She has bilateral shoulder pain but much more pronounced on the right side. We diagnosed adhesive capsulitis and recommended physical therapy. She isn't working with Sridhar Murphy here at the [facility] office. We started her on Celebrex which he had a reaction to this. She went off of it for a while. She developed some gastrointestinal symptoms but it is possible it did not relate to the Celebrex. The diclofenac cream didn't help. Because of increasing pain she's gone back on the Celebrex. Her arm feels heavy. She is a little bit frustrated. She currently rates the pain levels on the right 8 out of 10 on the left at 6 out of 10. Medical History:  Patient's medications, allergies, past medical, surgical, social and family histories were reviewed and updated as appropriate. Pertinent items are noted in HPI  Review of systems reviewed from Patient History Form dated on July 3, 2018 and available in the patient's chart under the Media tab. Vital Signs:  Vitals:    11/29/18 1144   BP: 108/68   Pulse: 78     Constitutional: She appears younger than her stated age and is in no apparent distress. Right Shoulder Examination:    Inspection:  Benign-appearing. No signs of any acute trauma. Palpation:  Point of maximal tenderness is over the pectoralis major. She is really no tenderness over the posterior joint line a.c. joint or biceps. The rotator cuff was a little bit tender. No subacromial or glenoid humeral crepitus. Active Range of Motion: Active range of motion is well preserved on the right. Elevation 175° and abduction 170°. Internal rotation to the back to T6. Active external rotation to 60°. Passive Range of Motion:  Within normal limits. Passive external rotation and abduction past 90° and internal rotation and abduction past 50°.   Cross body adduction is at 10 cm with pain over the pectoral muscles. Strength:  Good isometric strength is noted. Special Tests:  Biceps tendon signs are negative. No Isai muscle deformity. Comparison left shoulder range of motion is within normal limits in all planes. Good rhythm is noted actively. Assessment :  My impression is that Sathish Alfred has resolving adhesive capsulitis. That is clearly not a problem right now. She may have something else underlying this. I would recommend a little bit more physical therapy. She may try the diclofenac cream and continue on the Celebrex and see if it helps. We might even consider putting her on a prednisone taper. Impression:  Encounter Diagnosis   Name Primary?  Adhesive capsulitis of right shoulder Yes       Office Procedures:  Orders Placed This Encounter   Procedures    Ambulatory referral to Physical Therapy     Referral Priority:   Routine     Referral Type:   Eval and Treat     Referral Reason:   Specialty Services Required     Requested Specialty:   Physical Therapy     Number of Visits Requested:   1       Treatment Plan:  A new prescription for supervised physical therapy was provided. Think it is reasonable for her to continue with the Celebrex unless she starts experiencing serious gastrointestinal complaints. We'll see her again for follow-up in 4-5 weeks and we will discuss further intervention accordingly. All questions were answered today. Aldo Du MD, PhD  11/29/2018

## 2018-12-12 ENCOUNTER — TELEPHONE (OUTPATIENT)
Dept: ORTHOPEDIC SURGERY | Age: 53
End: 2018-12-12

## 2018-12-12 ENCOUNTER — TREATMENT (OUTPATIENT)
Dept: PHYSICAL THERAPY | Age: 53
End: 2018-12-12
Payer: COMMERCIAL

## 2018-12-12 DIAGNOSIS — M25.511 PAIN IN JOINT OF RIGHT SHOULDER: ICD-10-CM

## 2018-12-12 DIAGNOSIS — M75.42 IMPINGEMENT SYNDROME OF LEFT SHOULDER: ICD-10-CM

## 2018-12-12 DIAGNOSIS — M25.512 LEFT SHOULDER PAIN, UNSPECIFIED CHRONICITY: ICD-10-CM

## 2018-12-12 DIAGNOSIS — M25.611 DECREASED ROM OF RIGHT SHOULDER: ICD-10-CM

## 2018-12-12 DIAGNOSIS — M75.01 ADHESIVE CAPSULITIS OF RIGHT SHOULDER: Primary | ICD-10-CM

## 2018-12-12 PROCEDURE — 97140 MANUAL THERAPY 1/> REGIONS: CPT | Performed by: PHYSICAL THERAPIST

## 2018-12-12 PROCEDURE — 97530 THERAPEUTIC ACTIVITIES: CPT | Performed by: PHYSICAL THERAPIST

## 2018-12-12 PROCEDURE — 97110 THERAPEUTIC EXERCISES: CPT | Performed by: PHYSICAL THERAPIST

## 2018-12-12 PROCEDURE — 97112 NEUROMUSCULAR REEDUCATION: CPT | Performed by: PHYSICAL THERAPIST

## 2018-12-20 ENCOUNTER — OFFICE VISIT (OUTPATIENT)
Dept: ORTHOPEDIC SURGERY | Age: 53
End: 2018-12-20
Payer: COMMERCIAL

## 2018-12-20 VITALS
HEART RATE: 75 BPM | SYSTOLIC BLOOD PRESSURE: 113 MMHG | HEIGHT: 70 IN | BODY MASS INDEX: 17.04 KG/M2 | DIASTOLIC BLOOD PRESSURE: 75 MMHG | WEIGHT: 119 LBS

## 2018-12-20 DIAGNOSIS — M75.81 RIGHT ROTATOR CUFF TENDONITIS: ICD-10-CM

## 2018-12-20 DIAGNOSIS — M75.42 SHOULDER IMPINGEMENT SYNDROME, LEFT: Primary | ICD-10-CM

## 2018-12-20 DIAGNOSIS — M75.22 BICEPS TENDONITIS ON LEFT: ICD-10-CM

## 2018-12-20 DIAGNOSIS — M75.01 ADHESIVE CAPSULITIS OF RIGHT SHOULDER: ICD-10-CM

## 2018-12-20 PROCEDURE — 20610 DRAIN/INJ JOINT/BURSA W/O US: CPT | Performed by: ORTHOPAEDIC SURGERY

## 2018-12-20 PROCEDURE — 99214 OFFICE O/P EST MOD 30 MIN: CPT | Performed by: ORTHOPAEDIC SURGERY

## 2018-12-20 NOTE — PROGRESS NOTES
[address]  Bilateral  Upper Extremity Pain    Chief Complaint    Follow-up (bilateral shoudlers. pt states that she has not had any changes in pain and states that the right shoulder is now worse than the left )      Pain Assessment  Location of Pain: Shoulder  Location Modifiers: Left, Right  Severity of Pain: 9  Quality of Pain: Aching, Locking, Cracking  Duration of Pain: Persistent  Frequency of Pain: Constant  Aggravating Factors:  (certain movement )  Limiting Behavior: Yes  Relieving Factors: Rest  Result of Injury: No  Work-Related Injury: No  Are there other pain locations you wish to document?: No    History of Present Illness:  Mela Cushing is a 46 y.o. female presenting for routine follow-up regarding her right shoulder adhesive capsulitis and now left shoulder pain. Patient reports that she's made ample strides with her range of motion and her right shoulder with physical therapy. She has begun strengthening but now has tenderness over her coracoid on the left side. She continues to have pain at the extremes of range of motion in the right shoulder that does wake her up at night. She reports some diclofenac gel does not seem to be helping as well as the Celebrex which does not provide consistent relief without GI irritation. Beyond that she feels that she is a little bit frustrated with her progress although she does admit that she feels better with her range of motion. Medical History:    Review of Systems   Musculoskeletal:        Bilateral shoulder pain    All other systems reviewed and are negative. Patient's medications, allergies, past medical, surgical, social and family histories were reviewed and updated as appropriate.     Past Medical History:   Diagnosis Date    Arthritis       Past Surgical History:   Procedure Laterality Date    ELBOW SURGERY      EYE SURGERY      HYSTERECTOMY       Social History     Social History and subscapularis. Similar for deltoid. Stability: negative sulcus sign, no evidence of instability    Neurovascular:   2+ Radial pulses with capillary refill brisk <2 seconds. 2/2 sensation to light touch in C5-T1 distributions tested. Special Tests: Positive Bowman's on the right. Negative speed's and Yergason's examination. Positive impingement signs. Left comparison shoulder exam    Inspection:  No gross deformities, periscapular musculature is symmetrical without atrophy, no obvious winging    Palpation: She exhibits tenderness over the coracoid tip and short head of the biceps. Active/Passive ROM: full in forward flexion, abduction, external rotation with the elbow at the side, and internal rotation. Forward elevation 170°. Abduction 140°. Internal rotation to T4. External rotation 50°. Strength: 5/5 strength testing of deltoid, supraspinatus, infraspinatus, teres minor, and subscapularis    Stability: negative sulcus sign, no evidence of instability    Neurovascular: Neurovascularly intact    Negative speed Yergason's and Bowman's exam.  She does exhibit some posterior capsule tightness but her range of motion is approximately 5 cm which is symmetrical with contralateral side and this maneuver. Imaging:     No new images taken today    Office Procedures:  Orders Placed This Encounter   Procedures    [facility] Physical Therapy     Referral Priority:   Routine     Referral Type:   Eval and Treat     Referral Reason:   Specialty Services Required     Requested Specialty:   Physical Therapy     Number of Visits Requested:   1    NV ARTHROCENTESIS ASPIR&/INJ MAJOR JT/BURSA W/O US    NV METHYLPREDNISOLONE 40 MG INJ       Assessment :  Valerio Listen with resolving adhesive capsulitis right shoulder, rotator cuff tendinitis right shoulder. Her left shoulder symptoms likely relate to overuse.  Her shoulder pain is over the short head of the biceps and over the coracoid tip. Impression:  Encounter Diagnoses   Name Primary?  Shoulder impingement syndrome, left Yes    Adhesive capsulitis of right shoulder     Right rotator cuff tendonitis     Biceps tendonitis on left          Plan:  Recommend corticosteroid injection to her right shoulder which she tolerated well. Patient reported improvement of her symptoms. Patient has been advised that she can continue with physical therapy for strengthening for her rotator cuff on the right but to back off on biceps directed activities for her left shoulder. We'll have her come back in 6 weeks to see how her symptoms are changing    Procedure: Risks and benefits of a corticosteroid injection were discussed with Lew Pierce. 80 milligrams of Depo Medrol and 8 CC of 1% lidocaine were injected in the right shoulder glenohumeral joint and subacromial space following chlorhexidine prep. She  tolerated the procedure well with no immediate adverse sequelae after the injection. Blaze Silva MD  Fellow, [address] [place] [address]  Date:    12/20/2018    The encounter with Belia Aguilar was supervised by Dr Valeria Daniels, who personally examined the patient and reviewed the plan. This dictation was performed with a verbal recognition program (DRAGON) and it was checked for errors. It is possible that there are still dictated errors within this office note. If so, please bring any errors to my attention for an addendum. All efforts were made to ensure that this office note is accurate.   ___________________  I was physically present and personally supervised the Orthopaedic Sports Medicine Fellow in the evaluation and development of a treatment plan for this patient. I personally interviewed the patient and performed a physical examination. In addition, I discussed the patient's condition and treatment options with them.  I have also reviewed and agree with the past

## 2018-12-20 NOTE — PROGRESS NOTES
Review of Systems   Musculoskeletal:        Bilateral shoulder pain    All other systems reviewed and are negative.

## 2018-12-20 NOTE — PROGRESS NOTES
Depomedrol     NDC#: 6931-6314-81  Lot: [id number]  Expiration Date: 12/31/20  Dose: 2cc  Location: injection was given in Right  shoulder      Lido    NDC#: 6744-7399-81  Lot: [id number]  Expiration Date: 6/20  Dose:8cc  Location: injection was given in Right shoulder

## 2018-12-27 ENCOUNTER — TREATMENT (OUTPATIENT)
Dept: PHYSICAL THERAPY | Age: 53
End: 2018-12-27
Payer: COMMERCIAL

## 2018-12-27 DIAGNOSIS — M25.512 LEFT SHOULDER PAIN, UNSPECIFIED CHRONICITY: ICD-10-CM

## 2018-12-27 DIAGNOSIS — M25.611 DECREASED ROM OF RIGHT SHOULDER: ICD-10-CM

## 2018-12-27 DIAGNOSIS — M25.511 PAIN IN JOINT OF RIGHT SHOULDER: ICD-10-CM

## 2018-12-27 DIAGNOSIS — M75.42 IMPINGEMENT SYNDROME OF LEFT SHOULDER: ICD-10-CM

## 2018-12-27 DIAGNOSIS — M75.01 ADHESIVE CAPSULITIS OF RIGHT SHOULDER: Primary | ICD-10-CM

## 2018-12-27 PROCEDURE — 97112 NEUROMUSCULAR REEDUCATION: CPT | Performed by: PHYSICAL THERAPIST

## 2018-12-27 PROCEDURE — 97530 THERAPEUTIC ACTIVITIES: CPT | Performed by: PHYSICAL THERAPIST

## 2018-12-27 PROCEDURE — 97110 THERAPEUTIC EXERCISES: CPT | Performed by: PHYSICAL THERAPIST

## 2018-12-27 PROCEDURE — 97140 MANUAL THERAPY 1/> REGIONS: CPT | Performed by: PHYSICAL THERAPIST

## 2018-12-27 NOTE — PLAN OF CARE
indicated by patients Functional Deficits. ONGOING  4. Patient will return to all functional activities without increased symptoms or restriction. 5. Patient will be able to complete bathing and dressing activities with R UE without increased pain in the R shoulder so that she may return to PLOF for all ADLs. ONGOING  6. Patient will be able to play 9 holes of golf without increased pain in the R shoulder so that she may return to PLOF. ONGOING     Progression Towards Functional goals:  [x] Patient is progressing as expected towards functional goals listed. [] Progression is slowed due to complexities listed. [] Progression has been slowed due to co-morbidities. [] Plan just implemented, too soon to assess goals progression  [] Other:     ASSESSMENT:  Patient exhibits significant TTP in in the posterior R shoulder/scapular musculature and R UT today. She reports mild increase in discomfort with TB triceps on R UE, but notes no lasting affects. She was instructed to resume gentle strengthening activities as part of HEP, and that if pain is still disrupting her sleep next week, we will get her back in to see Dr. Elmer Sanchez sooner.     Treatment/Activity Tolerance:  [x] Patient tolerated treatment well [] Patient limited by fatique  [] Patient limited by pain  [] Patient limited by other medical complications  [] Other:     Prognosis: [x] Good [] Fair  [] Poor    Patient Requires Follow-up: [x] Yes  [] No    PLAN: 1-2x/week for 4-6 weeks  [x] Continue per plan of care [] Alter current plan (see comments)  [] Plan of care initiated [] Hold pending MD visit [] Discharge    Electronically signed by:      Elvia Recinos license: [id number]  [id number] [address] [facility] [address] license: [id number]     Nilton Hobbs PT, DPT, OMT-C

## 2019-01-04 ENCOUNTER — TREATMENT (OUTPATIENT)
Dept: PHYSICAL THERAPY | Age: 54
End: 2019-01-04
Payer: COMMERCIAL

## 2019-01-04 DIAGNOSIS — M25.611 DECREASED ROM OF RIGHT SHOULDER: ICD-10-CM

## 2019-01-04 DIAGNOSIS — M25.511 PAIN IN JOINT OF RIGHT SHOULDER: ICD-10-CM

## 2019-01-04 DIAGNOSIS — M75.01 ADHESIVE CAPSULITIS OF RIGHT SHOULDER: Primary | ICD-10-CM

## 2019-01-04 DIAGNOSIS — M75.42 IMPINGEMENT SYNDROME OF LEFT SHOULDER: ICD-10-CM

## 2019-01-04 DIAGNOSIS — M25.512 LEFT SHOULDER PAIN, UNSPECIFIED CHRONICITY: ICD-10-CM

## 2019-01-04 PROCEDURE — 97112 NEUROMUSCULAR REEDUCATION: CPT | Performed by: PHYSICAL THERAPIST

## 2019-01-04 PROCEDURE — 97110 THERAPEUTIC EXERCISES: CPT | Performed by: PHYSICAL THERAPIST

## 2019-01-04 PROCEDURE — 97140 MANUAL THERAPY 1/> REGIONS: CPT | Performed by: PHYSICAL THERAPIST

## 2019-01-04 PROCEDURE — 97530 THERAPEUTIC ACTIVITIES: CPT | Performed by: PHYSICAL THERAPIST

## 2019-01-08 ENCOUNTER — TREATMENT (OUTPATIENT)
Dept: PHYSICAL THERAPY | Age: 54
End: 2019-01-08
Payer: COMMERCIAL

## 2019-01-08 DIAGNOSIS — M25.512 LEFT SHOULDER PAIN, UNSPECIFIED CHRONICITY: ICD-10-CM

## 2019-01-08 DIAGNOSIS — M75.01 ADHESIVE CAPSULITIS OF RIGHT SHOULDER: Primary | ICD-10-CM

## 2019-01-08 DIAGNOSIS — M25.511 BILATERAL SHOULDER PAIN, UNSPECIFIED CHRONICITY: Primary | ICD-10-CM

## 2019-01-08 DIAGNOSIS — M25.511 PAIN IN JOINT OF RIGHT SHOULDER: ICD-10-CM

## 2019-01-08 DIAGNOSIS — M25.512 BILATERAL SHOULDER PAIN, UNSPECIFIED CHRONICITY: Primary | ICD-10-CM

## 2019-01-08 DIAGNOSIS — M75.42 IMPINGEMENT SYNDROME OF LEFT SHOULDER: ICD-10-CM

## 2019-01-08 DIAGNOSIS — M25.611 DECREASED ROM OF RIGHT SHOULDER: ICD-10-CM

## 2019-01-08 PROCEDURE — 97530 THERAPEUTIC ACTIVITIES: CPT | Performed by: PHYSICAL THERAPIST

## 2019-01-08 PROCEDURE — 97112 NEUROMUSCULAR REEDUCATION: CPT | Performed by: PHYSICAL THERAPIST

## 2019-01-08 PROCEDURE — 97140 MANUAL THERAPY 1/> REGIONS: CPT | Performed by: PHYSICAL THERAPIST

## 2019-01-08 PROCEDURE — MISCD34 PULLEY'S: Performed by: ORTHOPAEDIC SURGERY

## 2019-01-08 PROCEDURE — 97110 THERAPEUTIC EXERCISES: CPT | Performed by: PHYSICAL THERAPIST

## 2019-01-10 ENCOUNTER — TREATMENT (OUTPATIENT)
Dept: PHYSICAL THERAPY | Age: 54
End: 2019-01-10
Payer: COMMERCIAL

## 2019-01-10 DIAGNOSIS — M25.511 PAIN IN JOINT OF RIGHT SHOULDER: ICD-10-CM

## 2019-01-10 DIAGNOSIS — M25.512 LEFT SHOULDER PAIN, UNSPECIFIED CHRONICITY: ICD-10-CM

## 2019-01-10 DIAGNOSIS — M75.42 IMPINGEMENT SYNDROME OF LEFT SHOULDER: ICD-10-CM

## 2019-01-10 DIAGNOSIS — M75.01 ADHESIVE CAPSULITIS OF RIGHT SHOULDER: Primary | ICD-10-CM

## 2019-01-10 DIAGNOSIS — M25.611 DECREASED ROM OF RIGHT SHOULDER: ICD-10-CM

## 2019-01-10 PROCEDURE — 97112 NEUROMUSCULAR REEDUCATION: CPT | Performed by: PHYSICAL THERAPIST

## 2019-01-10 PROCEDURE — 97110 THERAPEUTIC EXERCISES: CPT | Performed by: PHYSICAL THERAPIST

## 2019-01-10 PROCEDURE — 97140 MANUAL THERAPY 1/> REGIONS: CPT | Performed by: PHYSICAL THERAPIST

## 2019-01-10 PROCEDURE — 97530 THERAPEUTIC ACTIVITIES: CPT | Performed by: PHYSICAL THERAPIST

## 2019-01-15 ENCOUNTER — TREATMENT (OUTPATIENT)
Dept: PHYSICAL THERAPY | Age: 54
End: 2019-01-15
Payer: COMMERCIAL

## 2019-01-15 DIAGNOSIS — M75.01 ADHESIVE CAPSULITIS OF RIGHT SHOULDER: Primary | ICD-10-CM

## 2019-01-15 DIAGNOSIS — M25.611 DECREASED ROM OF RIGHT SHOULDER: ICD-10-CM

## 2019-01-15 DIAGNOSIS — M75.42 IMPINGEMENT SYNDROME OF LEFT SHOULDER: ICD-10-CM

## 2019-01-15 DIAGNOSIS — M25.512 LEFT SHOULDER PAIN, UNSPECIFIED CHRONICITY: ICD-10-CM

## 2019-01-15 DIAGNOSIS — M25.511 PAIN IN JOINT OF RIGHT SHOULDER: ICD-10-CM

## 2019-01-15 PROCEDURE — 97140 MANUAL THERAPY 1/> REGIONS: CPT | Performed by: PHYSICAL THERAPIST

## 2019-01-15 PROCEDURE — 97112 NEUROMUSCULAR REEDUCATION: CPT | Performed by: PHYSICAL THERAPIST

## 2019-01-15 PROCEDURE — 97530 THERAPEUTIC ACTIVITIES: CPT | Performed by: PHYSICAL THERAPIST

## 2019-01-15 PROCEDURE — 97110 THERAPEUTIC EXERCISES: CPT | Performed by: PHYSICAL THERAPIST

## 2019-01-17 ENCOUNTER — TREATMENT (OUTPATIENT)
Dept: PHYSICAL THERAPY | Age: 54
End: 2019-01-17
Payer: COMMERCIAL

## 2019-01-17 DIAGNOSIS — M75.42 IMPINGEMENT SYNDROME OF LEFT SHOULDER: ICD-10-CM

## 2019-01-17 DIAGNOSIS — M75.01 ADHESIVE CAPSULITIS OF RIGHT SHOULDER: Primary | ICD-10-CM

## 2019-01-17 DIAGNOSIS — M25.611 DECREASED ROM OF RIGHT SHOULDER: ICD-10-CM

## 2019-01-17 DIAGNOSIS — M25.511 PAIN IN JOINT OF RIGHT SHOULDER: ICD-10-CM

## 2019-01-17 DIAGNOSIS — M25.512 LEFT SHOULDER PAIN, UNSPECIFIED CHRONICITY: ICD-10-CM

## 2019-01-17 PROCEDURE — 97110 THERAPEUTIC EXERCISES: CPT | Performed by: PHYSICAL THERAPIST

## 2019-01-17 PROCEDURE — 97140 MANUAL THERAPY 1/> REGIONS: CPT | Performed by: PHYSICAL THERAPIST

## 2019-01-17 PROCEDURE — 97530 THERAPEUTIC ACTIVITIES: CPT | Performed by: PHYSICAL THERAPIST

## 2019-01-31 ENCOUNTER — OFFICE VISIT (OUTPATIENT)
Dept: ORTHOPEDIC SURGERY | Age: 54
End: 2019-01-31
Payer: COMMERCIAL

## 2019-01-31 ENCOUNTER — TELEPHONE (OUTPATIENT)
Dept: ORTHOPEDIC SURGERY | Age: 54
End: 2019-01-31

## 2019-01-31 VITALS
BODY MASS INDEX: 17.04 KG/M2 | DIASTOLIC BLOOD PRESSURE: 68 MMHG | HEIGHT: 70 IN | WEIGHT: 119.05 LBS | SYSTOLIC BLOOD PRESSURE: 102 MMHG | HEART RATE: 85 BPM

## 2019-01-31 DIAGNOSIS — G89.29 CHRONIC PAIN OF BOTH SHOULDERS: Primary | ICD-10-CM

## 2019-01-31 DIAGNOSIS — M25.512 BILATERAL SHOULDER PAIN, UNSPECIFIED CHRONICITY: ICD-10-CM

## 2019-01-31 DIAGNOSIS — M75.01 ADHESIVE CAPSULITIS OF RIGHT SHOULDER: ICD-10-CM

## 2019-01-31 DIAGNOSIS — M25.512 CHRONIC PAIN OF BOTH SHOULDERS: Primary | ICD-10-CM

## 2019-01-31 DIAGNOSIS — M75.81 RIGHT ROTATOR CUFF TENDONITIS: ICD-10-CM

## 2019-01-31 DIAGNOSIS — M25.511 BILATERAL SHOULDER PAIN, UNSPECIFIED CHRONICITY: ICD-10-CM

## 2019-01-31 DIAGNOSIS — M25.511 CHRONIC PAIN OF BOTH SHOULDERS: Primary | ICD-10-CM

## 2019-01-31 DIAGNOSIS — M75.42 SHOULDER IMPINGEMENT SYNDROME, LEFT: ICD-10-CM

## 2019-01-31 PROCEDURE — 99214 OFFICE O/P EST MOD 30 MIN: CPT | Performed by: ORTHOPAEDIC SURGERY

## 2019-02-08 RX ORDER — ASCORBIC ACID 500 MG
500 TABLET ORAL DAILY
COMMUNITY

## 2019-02-08 RX ORDER — M-VIT,TX,IRON,MINS/CALC/FOLIC 27MG-0.4MG
1 TABLET ORAL DAILY
COMMUNITY

## 2019-02-10 ENCOUNTER — ANESTHESIA EVENT (OUTPATIENT)
Dept: OPERATING ROOM | Age: 54
End: 2019-02-10
Payer: COMMERCIAL

## 2019-02-11 ENCOUNTER — ANESTHESIA (OUTPATIENT)
Dept: OPERATING ROOM | Age: 54
End: 2019-02-11
Payer: COMMERCIAL

## 2019-02-11 ENCOUNTER — HOSPITAL ENCOUNTER (OUTPATIENT)
Age: 54
Setting detail: OUTPATIENT SURGERY
Discharge: HOME | End: 2019-02-11
Attending: ORTHOPAEDIC SURGERY | Admitting: ORTHOPAEDIC SURGERY
Payer: COMMERCIAL

## 2019-02-11 VITALS
SYSTOLIC BLOOD PRESSURE: 116 MMHG | OXYGEN SATURATION: 95 % | HEART RATE: 61 BPM | TEMPERATURE: 97.5 F | BODY MASS INDEX: 19.44 KG/M2 | HEIGHT: 66 IN | RESPIRATION RATE: 17 BRPM | WEIGHT: 121 LBS | DIASTOLIC BLOOD PRESSURE: 73 MMHG

## 2019-02-11 VITALS
SYSTOLIC BLOOD PRESSURE: 106 MMHG | RESPIRATION RATE: 11 BRPM | TEMPERATURE: 95.4 F | OXYGEN SATURATION: 100 % | DIASTOLIC BLOOD PRESSURE: 64 MMHG

## 2019-02-11 DIAGNOSIS — Z98.890 S/P ARTHROSCOPY OF RIGHT SHOULDER: Primary | ICD-10-CM

## 2019-02-11 PROCEDURE — 6360000002 HC RX W HCPCS: Performed by: NURSE ANESTHETIST, CERTIFIED REGISTERED

## 2019-02-11 PROCEDURE — L3650 SO 8 ABD RESTRAINT PRE OTS: HCPCS | Performed by: ORTHOPAEDIC SURGERY

## 2019-02-11 PROCEDURE — L3660 SO 8 AB RSTR CAN/WEB PRE OTS: HCPCS | Performed by: ORTHOPAEDIC SURGERY

## 2019-02-11 PROCEDURE — 7100000000 HC PACU RECOVERY - FIRST 15 MIN: Performed by: ORTHOPAEDIC SURGERY

## 2019-02-11 PROCEDURE — 7100000001 HC PACU RECOVERY - ADDTL 15 MIN: Performed by: ORTHOPAEDIC SURGERY

## 2019-02-11 PROCEDURE — 6360000002 HC RX W HCPCS: Performed by: ORTHOPAEDIC SURGERY

## 2019-02-11 PROCEDURE — 2580000003 HC RX 258: Performed by: ORTHOPAEDIC SURGERY

## 2019-02-11 PROCEDURE — 2500000003 HC RX 250 WO HCPCS: Performed by: NURSE ANESTHETIST, CERTIFIED REGISTERED

## 2019-02-11 PROCEDURE — 3700000001 HC ADD 15 MINUTES (ANESTHESIA): Performed by: ORTHOPAEDIC SURGERY

## 2019-02-11 PROCEDURE — 76942 ECHO GUIDE FOR BIOPSY: CPT | Performed by: ANESTHESIOLOGY

## 2019-02-11 PROCEDURE — 3600000004 HC SURGERY LEVEL 4 BASE: Performed by: ORTHOPAEDIC SURGERY

## 2019-02-11 PROCEDURE — 7100000010 HC PHASE II RECOVERY - FIRST 15 MIN: Performed by: ORTHOPAEDIC SURGERY

## 2019-02-11 PROCEDURE — C1713 ANCHOR/SCREW BN/BN,TIS/BN: HCPCS | Performed by: ORTHOPAEDIC SURGERY

## 2019-02-11 PROCEDURE — 3600000014 HC SURGERY LEVEL 4 ADDTL 15MIN: Performed by: ORTHOPAEDIC SURGERY

## 2019-02-11 PROCEDURE — 2580000003 HC RX 258: Performed by: ANESTHESIOLOGY

## 2019-02-11 PROCEDURE — 2720000010 HC SURG SUPPLY STERILE: Performed by: ORTHOPAEDIC SURGERY

## 2019-02-11 PROCEDURE — 6360000002 HC RX W HCPCS: Performed by: ANESTHESIOLOGY

## 2019-02-11 PROCEDURE — 3700000000 HC ANESTHESIA ATTENDED CARE: Performed by: ORTHOPAEDIC SURGERY

## 2019-02-11 PROCEDURE — 7100000011 HC PHASE II RECOVERY - ADDTL 15 MIN: Performed by: ORTHOPAEDIC SURGERY

## 2019-02-11 PROCEDURE — 2709999900 HC NON-CHARGEABLE SUPPLY: Performed by: ORTHOPAEDIC SURGERY

## 2019-02-11 DEVICE — IMPLANTABLE DEVICE
Type: IMPLANTABLE DEVICE | Site: SHOULDER | Status: FUNCTIONAL
Brand: ROTATION MEDICAL RECONSTITUTED COLLAGEN SCAFFOLD - ARTHROSCOPIC, MEDIUM

## 2019-02-11 DEVICE — IMPLANTABLE DEVICE
Type: IMPLANTABLE DEVICE | Site: SHOULDER | Status: FUNCTIONAL
Brand: BONE ANCHORS WITH ARTHROSCOPIC DELIVERY SYSTEM

## 2019-02-11 DEVICE — Z INACTIVE USE 2600835 ANCHOR TEND 8 FOR REGENETEN BIOINDUCTIVE IMPL SYS: Type: IMPLANTABLE DEVICE | Site: SHOULDER | Status: FUNCTIONAL

## 2019-02-11 RX ORDER — ONDANSETRON 2 MG/ML
4 INJECTION INTRAMUSCULAR; INTRAVENOUS
Status: DISCONTINUED | OUTPATIENT
Start: 2019-02-11 | End: 2019-02-11 | Stop reason: HOSPADM

## 2019-02-11 RX ORDER — LIDOCAINE HYDROCHLORIDE 10 MG/ML
1 INJECTION, SOLUTION EPIDURAL; INFILTRATION; INTRACAUDAL; PERINEURAL
Status: DISCONTINUED | OUTPATIENT
Start: 2019-02-11 | End: 2019-02-11 | Stop reason: HOSPADM

## 2019-02-11 RX ORDER — MIDAZOLAM HYDROCHLORIDE 1 MG/ML
2 INJECTION INTRAMUSCULAR; INTRAVENOUS
Status: COMPLETED | OUTPATIENT
Start: 2019-02-11 | End: 2019-02-11

## 2019-02-11 RX ORDER — LABETALOL HYDROCHLORIDE 5 MG/ML
5 INJECTION, SOLUTION INTRAVENOUS EVERY 10 MIN PRN
Status: DISCONTINUED | OUTPATIENT
Start: 2019-02-11 | End: 2019-02-11 | Stop reason: HOSPADM

## 2019-02-11 RX ORDER — SODIUM CHLORIDE, SODIUM LACTATE, POTASSIUM CHLORIDE, CALCIUM CHLORIDE 600; 310; 30; 20 MG/100ML; MG/100ML; MG/100ML; MG/100ML
INJECTION, SOLUTION INTRAVENOUS CONTINUOUS
Status: DISCONTINUED | OUTPATIENT
Start: 2019-02-11 | End: 2019-02-11 | Stop reason: HOSPADM

## 2019-02-11 RX ORDER — DEXAMETHASONE SODIUM PHOSPHATE 4 MG/ML
INJECTION, SOLUTION INTRA-ARTICULAR; INTRALESIONAL; INTRAMUSCULAR; INTRAVENOUS; SOFT TISSUE PRN
Status: DISCONTINUED | OUTPATIENT
Start: 2019-02-11 | End: 2019-02-11 | Stop reason: SDUPTHER

## 2019-02-11 RX ORDER — HYDRALAZINE HYDROCHLORIDE 20 MG/ML
5 INJECTION INTRAMUSCULAR; INTRAVENOUS EVERY 10 MIN PRN
Status: DISCONTINUED | OUTPATIENT
Start: 2019-02-11 | End: 2019-02-11 | Stop reason: HOSPADM

## 2019-02-11 RX ORDER — OXYCODONE HYDROCHLORIDE AND ACETAMINOPHEN 5; 325 MG/1; MG/1
1-2 TABLET ORAL EVERY 4 HOURS PRN
Qty: 28 TABLET | Refills: 0 | Status: SHIPPED | OUTPATIENT
Start: 2019-02-11 | End: 2019-02-18

## 2019-02-11 RX ORDER — CEFAZOLIN SODIUM 2 G/50ML
2 SOLUTION INTRAVENOUS ONCE
Status: COMPLETED | OUTPATIENT
Start: 2019-02-11 | End: 2019-02-11

## 2019-02-11 RX ORDER — FENTANYL CITRATE 50 UG/ML
100 INJECTION, SOLUTION INTRAMUSCULAR; INTRAVENOUS ONCE
Status: COMPLETED | OUTPATIENT
Start: 2019-02-11 | End: 2019-02-11

## 2019-02-11 RX ORDER — PROPOFOL 10 MG/ML
INJECTION, EMULSION INTRAVENOUS PRN
Status: DISCONTINUED | OUTPATIENT
Start: 2019-02-11 | End: 2019-02-11 | Stop reason: SDUPTHER

## 2019-02-11 RX ORDER — ROCURONIUM BROMIDE 10 MG/ML
INJECTION, SOLUTION INTRAVENOUS PRN
Status: DISCONTINUED | OUTPATIENT
Start: 2019-02-11 | End: 2019-02-11 | Stop reason: SDUPTHER

## 2019-02-11 RX ORDER — DOCUSATE SODIUM 100 MG/1
100 CAPSULE, LIQUID FILLED ORAL 2 TIMES DAILY
Qty: 40 CAPSULE | Refills: 0 | Status: SHIPPED | OUTPATIENT
Start: 2019-02-11 | End: 2019-03-13

## 2019-02-11 RX ORDER — GLYCOPYRROLATE 0.2 MG/ML
INJECTION INTRAMUSCULAR; INTRAVENOUS PRN
Status: DISCONTINUED | OUTPATIENT
Start: 2019-02-11 | End: 2019-02-11 | Stop reason: SDUPTHER

## 2019-02-11 RX ORDER — BUPIVACAINE HYDROCHLORIDE 5 MG/ML
INJECTION, SOLUTION EPIDURAL; INTRACAUDAL
Status: DISCONTINUED
Start: 2019-02-11 | End: 2019-02-11 | Stop reason: HOSPADM

## 2019-02-11 RX ORDER — LIDOCAINE HYDROCHLORIDE 20 MG/ML
INJECTION, SOLUTION INFILTRATION; PERINEURAL PRN
Status: DISCONTINUED | OUTPATIENT
Start: 2019-02-11 | End: 2019-02-11 | Stop reason: SDUPTHER

## 2019-02-11 RX ORDER — SODIUM CHLORIDE 0.9 % (FLUSH) 0.9 %
10 SYRINGE (ML) INJECTION EVERY 12 HOURS SCHEDULED
Status: DISCONTINUED | OUTPATIENT
Start: 2019-02-11 | End: 2019-02-11 | Stop reason: HOSPADM

## 2019-02-11 RX ORDER — SODIUM CHLORIDE 0.9 % (FLUSH) 0.9 %
10 SYRINGE (ML) INJECTION PRN
Status: DISCONTINUED | OUTPATIENT
Start: 2019-02-11 | End: 2019-02-11 | Stop reason: HOSPADM

## 2019-02-11 RX ORDER — ONDANSETRON 2 MG/ML
INJECTION INTRAMUSCULAR; INTRAVENOUS PRN
Status: DISCONTINUED | OUTPATIENT
Start: 2019-02-11 | End: 2019-02-11 | Stop reason: SDUPTHER

## 2019-02-11 RX ADMIN — DEXAMETHASONE SODIUM PHOSPHATE 4 MG: 4 INJECTION, SOLUTION INTRAMUSCULAR; INTRAVENOUS at 13:38

## 2019-02-11 RX ADMIN — SODIUM CHLORIDE, POTASSIUM CHLORIDE, SODIUM LACTATE AND CALCIUM CHLORIDE: 600; 310; 30; 20 INJECTION, SOLUTION INTRAVENOUS at 12:43

## 2019-02-11 RX ADMIN — ROCURONIUM BROMIDE 40 MG: 10 INJECTION, SOLUTION INTRAVENOUS at 13:22

## 2019-02-11 RX ADMIN — GLYCOPYRROLATE 0.4 MG: 0.2 INJECTION INTRAMUSCULAR; INTRAVENOUS at 15:35

## 2019-02-11 RX ADMIN — PROPOFOL 150 MG: 10 INJECTION, EMULSION INTRAVENOUS at 13:22

## 2019-02-11 RX ADMIN — SODIUM CHLORIDE, SODIUM LACTATE, POTASSIUM CHLORIDE, AND CALCIUM CHLORIDE: 600; 310; 30; 20 INJECTION, SOLUTION INTRAVENOUS at 13:12

## 2019-02-11 RX ADMIN — NEOSTIGMINE METHYLSULFATE 3 MG: 1 INJECTION INTRAMUSCULAR; INTRAVENOUS; SUBCUTANEOUS at 15:35

## 2019-02-11 RX ADMIN — FENTANYL CITRATE 50 MCG: 50 INJECTION INTRAMUSCULAR; INTRAVENOUS at 12:26

## 2019-02-11 RX ADMIN — CEFAZOLIN SODIUM 2 G: 2 SOLUTION INTRAVENOUS at 13:31

## 2019-02-11 RX ADMIN — SODIUM CHLORIDE, SODIUM LACTATE, POTASSIUM CHLORIDE, AND CALCIUM CHLORIDE: 600; 310; 30; 20 INJECTION, SOLUTION INTRAVENOUS at 14:00

## 2019-02-11 RX ADMIN — MIDAZOLAM HYDROCHLORIDE 2 MG: 2 INJECTION, SOLUTION INTRAMUSCULAR; INTRAVENOUS at 12:43

## 2019-02-11 RX ADMIN — ROCURONIUM BROMIDE 10 MG: 10 INJECTION, SOLUTION INTRAVENOUS at 13:48

## 2019-02-11 RX ADMIN — LIDOCAINE HYDROCHLORIDE 50 MG: 20 INJECTION, SOLUTION INFILTRATION; PERINEURAL at 13:22

## 2019-02-11 RX ADMIN — ONDANSETRON 4 MG: 2 INJECTION INTRAMUSCULAR; INTRAVENOUS at 15:31

## 2019-02-11 RX ADMIN — GLYCOPYRROLATE 0.2 MG: 0.2 INJECTION INTRAMUSCULAR; INTRAVENOUS at 13:12

## 2019-02-11 ASSESSMENT — PULMONARY FUNCTION TESTS
PIF_VALUE: 18
PIF_VALUE: 17
PIF_VALUE: 1
PIF_VALUE: 17
PIF_VALUE: 16
PIF_VALUE: 18
PIF_VALUE: 16
PIF_VALUE: 17
PIF_VALUE: 18
PIF_VALUE: 17
PIF_VALUE: 6
PIF_VALUE: 17
PIF_VALUE: 17
PIF_VALUE: 3
PIF_VALUE: 16
PIF_VALUE: 17
PIF_VALUE: 13
PIF_VALUE: 15
PIF_VALUE: 18
PIF_VALUE: 17
PIF_VALUE: 8
PIF_VALUE: 13
PIF_VALUE: 17
PIF_VALUE: 15
PIF_VALUE: 18
PIF_VALUE: 13
PIF_VALUE: 0
PIF_VALUE: 13
PIF_VALUE: 17
PIF_VALUE: 6
PIF_VALUE: 1
PIF_VALUE: 13
PIF_VALUE: 13
PIF_VALUE: 18
PIF_VALUE: 16
PIF_VALUE: 13
PIF_VALUE: 13
PIF_VALUE: 4
PIF_VALUE: 3
PIF_VALUE: 17
PIF_VALUE: 17
PIF_VALUE: 16
PIF_VALUE: 0
PIF_VALUE: 17
PIF_VALUE: 15
PIF_VALUE: 17
PIF_VALUE: 13
PIF_VALUE: 17
PIF_VALUE: 16
PIF_VALUE: 6
PIF_VALUE: 17
PIF_VALUE: 0
PIF_VALUE: 17
PIF_VALUE: 13
PIF_VALUE: 3
PIF_VALUE: 18
PIF_VALUE: 17
PIF_VALUE: 17
PIF_VALUE: 13
PIF_VALUE: 17
PIF_VALUE: 16
PIF_VALUE: 17
PIF_VALUE: 16
PIF_VALUE: 18
PIF_VALUE: 17
PIF_VALUE: 17
PIF_VALUE: 8
PIF_VALUE: 17
PIF_VALUE: 13
PIF_VALUE: 16
PIF_VALUE: 17
PIF_VALUE: 18
PIF_VALUE: 18
PIF_VALUE: 13
PIF_VALUE: 16
PIF_VALUE: 14
PIF_VALUE: 14
PIF_VALUE: 18
PIF_VALUE: 17
PIF_VALUE: 16
PIF_VALUE: 17
PIF_VALUE: 13
PIF_VALUE: 17
PIF_VALUE: 17
PIF_VALUE: 8
PIF_VALUE: 17
PIF_VALUE: 16
PIF_VALUE: 17
PIF_VALUE: 17
PIF_VALUE: 15
PIF_VALUE: 17
PIF_VALUE: 16
PIF_VALUE: 13
PIF_VALUE: 14
PIF_VALUE: 4
PIF_VALUE: 17
PIF_VALUE: 17
PIF_VALUE: 2
PIF_VALUE: 16
PIF_VALUE: 17
PIF_VALUE: 16
PIF_VALUE: 17
PIF_VALUE: 13
PIF_VALUE: 17
PIF_VALUE: 16
PIF_VALUE: 15
PIF_VALUE: 18
PIF_VALUE: 17
PIF_VALUE: 17
PIF_VALUE: 4
PIF_VALUE: 13
PIF_VALUE: 3
PIF_VALUE: 17
PIF_VALUE: 18
PIF_VALUE: 13
PIF_VALUE: 17
PIF_VALUE: 18
PIF_VALUE: 13
PIF_VALUE: 16
PIF_VALUE: 17
PIF_VALUE: 17
PIF_VALUE: 18
PIF_VALUE: 4
PIF_VALUE: 16
PIF_VALUE: 16
PIF_VALUE: 4
PIF_VALUE: 17
PIF_VALUE: 4
PIF_VALUE: 17
PIF_VALUE: 16
PIF_VALUE: 17
PIF_VALUE: 17
PIF_VALUE: 13
PIF_VALUE: 18
PIF_VALUE: 17
PIF_VALUE: 18
PIF_VALUE: 16
PIF_VALUE: 15
PIF_VALUE: 19
PIF_VALUE: 16
PIF_VALUE: 17
PIF_VALUE: 18
PIF_VALUE: 17

## 2019-02-11 ASSESSMENT — PAIN DESCRIPTION - DESCRIPTORS: DESCRIPTORS: ACHING

## 2019-02-11 ASSESSMENT — PAIN DESCRIPTION - LOCATION: LOCATION: SHOULDER

## 2019-02-11 ASSESSMENT — PAIN - FUNCTIONAL ASSESSMENT: PAIN_FUNCTIONAL_ASSESSMENT: 0-10

## 2019-02-11 ASSESSMENT — PAIN DESCRIPTION - ORIENTATION: ORIENTATION: RIGHT

## 2019-02-11 ASSESSMENT — PAIN DESCRIPTION - PAIN TYPE: TYPE: SURGICAL PAIN

## 2019-02-13 ENCOUNTER — EVALUATION (OUTPATIENT)
Dept: PHYSICAL THERAPY | Age: 54
End: 2019-02-13
Payer: COMMERCIAL

## 2019-02-13 DIAGNOSIS — M75.01 ADHESIVE CAPSULITIS OF RIGHT SHOULDER: Primary | ICD-10-CM

## 2019-02-13 DIAGNOSIS — M25.611 DECREASED ROM OF RIGHT SHOULDER: ICD-10-CM

## 2019-02-13 DIAGNOSIS — M25.511 PAIN IN JOINT OF RIGHT SHOULDER: ICD-10-CM

## 2019-02-13 DIAGNOSIS — M75.111 PARTIAL NONTRAUMATIC TEAR OF ROTATOR CUFF, RIGHT: ICD-10-CM

## 2019-02-13 PROCEDURE — G8985 CARRY GOAL STATUS: HCPCS | Performed by: PHYSICAL THERAPIST

## 2019-02-13 PROCEDURE — G8984 CARRY CURRENT STATUS: HCPCS | Performed by: PHYSICAL THERAPIST

## 2019-02-13 PROCEDURE — 97110 THERAPEUTIC EXERCISES: CPT | Performed by: PHYSICAL THERAPIST

## 2019-02-13 PROCEDURE — 97161 PT EVAL LOW COMPLEX 20 MIN: CPT | Performed by: PHYSICAL THERAPIST

## 2019-02-13 PROCEDURE — 97112 NEUROMUSCULAR REEDUCATION: CPT | Performed by: PHYSICAL THERAPIST

## 2019-02-15 ENCOUNTER — TREATMENT (OUTPATIENT)
Dept: PHYSICAL THERAPY | Age: 54
End: 2019-02-15
Payer: COMMERCIAL

## 2019-02-15 DIAGNOSIS — M25.611 DECREASED ROM OF RIGHT SHOULDER: ICD-10-CM

## 2019-02-15 DIAGNOSIS — M75.01 ADHESIVE CAPSULITIS OF RIGHT SHOULDER: Primary | ICD-10-CM

## 2019-02-15 DIAGNOSIS — M25.512 LEFT SHOULDER PAIN, UNSPECIFIED CHRONICITY: ICD-10-CM

## 2019-02-15 DIAGNOSIS — M75.111 PARTIAL NONTRAUMATIC TEAR OF ROTATOR CUFF, RIGHT: ICD-10-CM

## 2019-02-15 DIAGNOSIS — M25.511 PAIN IN JOINT OF RIGHT SHOULDER: ICD-10-CM

## 2019-02-15 DIAGNOSIS — M75.42 IMPINGEMENT SYNDROME OF LEFT SHOULDER: ICD-10-CM

## 2019-02-15 PROCEDURE — 97112 NEUROMUSCULAR REEDUCATION: CPT | Performed by: PHYSICAL THERAPIST

## 2019-02-15 PROCEDURE — 97110 THERAPEUTIC EXERCISES: CPT | Performed by: PHYSICAL THERAPIST

## 2019-02-18 ENCOUNTER — TREATMENT (OUTPATIENT)
Dept: PHYSICAL THERAPY | Age: 54
End: 2019-02-18
Payer: COMMERCIAL

## 2019-02-18 DIAGNOSIS — M25.611 DECREASED ROM OF RIGHT SHOULDER: ICD-10-CM

## 2019-02-18 DIAGNOSIS — M75.111 PARTIAL NONTRAUMATIC TEAR OF ROTATOR CUFF, RIGHT: ICD-10-CM

## 2019-02-18 DIAGNOSIS — M75.01 ADHESIVE CAPSULITIS OF RIGHT SHOULDER: Primary | ICD-10-CM

## 2019-02-18 DIAGNOSIS — M25.511 PAIN IN JOINT OF RIGHT SHOULDER: ICD-10-CM

## 2019-02-18 PROCEDURE — 97110 THERAPEUTIC EXERCISES: CPT | Performed by: PHYSICAL THERAPIST

## 2019-02-18 PROCEDURE — 97112 NEUROMUSCULAR REEDUCATION: CPT | Performed by: PHYSICAL THERAPIST

## 2019-02-20 ENCOUNTER — TREATMENT (OUTPATIENT)
Dept: PHYSICAL THERAPY | Age: 54
End: 2019-02-20
Payer: COMMERCIAL

## 2019-02-20 DIAGNOSIS — M75.111 PARTIAL NONTRAUMATIC TEAR OF ROTATOR CUFF, RIGHT: ICD-10-CM

## 2019-02-20 DIAGNOSIS — M25.511 PAIN IN JOINT OF RIGHT SHOULDER: ICD-10-CM

## 2019-02-20 DIAGNOSIS — M25.611 DECREASED ROM OF RIGHT SHOULDER: ICD-10-CM

## 2019-02-20 DIAGNOSIS — M75.01 ADHESIVE CAPSULITIS OF RIGHT SHOULDER: Primary | ICD-10-CM

## 2019-02-20 PROCEDURE — 97112 NEUROMUSCULAR REEDUCATION: CPT | Performed by: PHYSICAL THERAPIST

## 2019-02-20 PROCEDURE — 97110 THERAPEUTIC EXERCISES: CPT | Performed by: PHYSICAL THERAPIST

## 2019-02-21 ENCOUNTER — OFFICE VISIT (OUTPATIENT)
Dept: ORTHOPEDIC SURGERY | Age: 54
End: 2019-02-21

## 2019-02-21 VITALS
WEIGHT: 121.03 LBS | SYSTOLIC BLOOD PRESSURE: 93 MMHG | DIASTOLIC BLOOD PRESSURE: 59 MMHG | HEART RATE: 84 BPM | HEIGHT: 66 IN | BODY MASS INDEX: 19.45 KG/M2

## 2019-02-21 DIAGNOSIS — Z98.890 S/P SHOULDER SURGERY: Primary | ICD-10-CM

## 2019-02-21 DIAGNOSIS — Z98.890 STATUS POST RIGHT ROTATOR CUFF REPAIR: ICD-10-CM

## 2019-02-21 PROCEDURE — 99024 POSTOP FOLLOW-UP VISIT: CPT | Performed by: ORTHOPAEDIC SURGERY

## 2019-02-21 RX ORDER — IBUPROFEN 400 MG/1
200 TABLET ORAL EVERY 6 HOURS PRN
COMMUNITY

## 2019-02-26 ENCOUNTER — TREATMENT (OUTPATIENT)
Dept: PHYSICAL THERAPY | Age: 54
End: 2019-02-26
Payer: COMMERCIAL

## 2019-02-26 DIAGNOSIS — M25.611 DECREASED ROM OF RIGHT SHOULDER: ICD-10-CM

## 2019-02-26 DIAGNOSIS — M25.511 PAIN IN JOINT OF RIGHT SHOULDER: ICD-10-CM

## 2019-02-26 DIAGNOSIS — M75.111 PARTIAL NONTRAUMATIC TEAR OF ROTATOR CUFF, RIGHT: ICD-10-CM

## 2019-02-26 DIAGNOSIS — M75.01 ADHESIVE CAPSULITIS OF RIGHT SHOULDER: Primary | ICD-10-CM

## 2019-02-26 PROCEDURE — 97110 THERAPEUTIC EXERCISES: CPT | Performed by: PHYSICAL THERAPIST

## 2019-02-26 PROCEDURE — 97112 NEUROMUSCULAR REEDUCATION: CPT | Performed by: PHYSICAL THERAPIST

## 2019-03-01 ENCOUNTER — TREATMENT (OUTPATIENT)
Dept: PHYSICAL THERAPY | Age: 54
End: 2019-03-01
Payer: COMMERCIAL

## 2019-03-01 DIAGNOSIS — M75.111 PARTIAL NONTRAUMATIC TEAR OF ROTATOR CUFF, RIGHT: ICD-10-CM

## 2019-03-01 DIAGNOSIS — M25.511 PAIN IN JOINT OF RIGHT SHOULDER: ICD-10-CM

## 2019-03-01 DIAGNOSIS — M25.611 DECREASED ROM OF RIGHT SHOULDER: ICD-10-CM

## 2019-03-01 DIAGNOSIS — M75.01 ADHESIVE CAPSULITIS OF RIGHT SHOULDER: Primary | ICD-10-CM

## 2019-03-01 PROCEDURE — 97110 THERAPEUTIC EXERCISES: CPT | Performed by: PHYSICAL THERAPIST

## 2019-03-01 PROCEDURE — 97140 MANUAL THERAPY 1/> REGIONS: CPT | Performed by: PHYSICAL THERAPIST

## 2019-03-01 PROCEDURE — 97112 NEUROMUSCULAR REEDUCATION: CPT | Performed by: PHYSICAL THERAPIST

## 2019-03-05 ENCOUNTER — TREATMENT (OUTPATIENT)
Dept: PHYSICAL THERAPY | Age: 54
End: 2019-03-05
Payer: COMMERCIAL

## 2019-03-05 DIAGNOSIS — M75.111 PARTIAL NONTRAUMATIC TEAR OF ROTATOR CUFF, RIGHT: ICD-10-CM

## 2019-03-05 DIAGNOSIS — M25.611 DECREASED ROM OF RIGHT SHOULDER: ICD-10-CM

## 2019-03-05 DIAGNOSIS — M25.511 PAIN IN JOINT OF RIGHT SHOULDER: ICD-10-CM

## 2019-03-05 DIAGNOSIS — M75.01 ADHESIVE CAPSULITIS OF RIGHT SHOULDER: Primary | ICD-10-CM

## 2019-03-05 PROCEDURE — 97110 THERAPEUTIC EXERCISES: CPT | Performed by: PHYSICAL THERAPIST

## 2019-03-05 PROCEDURE — 97140 MANUAL THERAPY 1/> REGIONS: CPT | Performed by: PHYSICAL THERAPIST

## 2019-03-05 PROCEDURE — 97112 NEUROMUSCULAR REEDUCATION: CPT | Performed by: PHYSICAL THERAPIST

## 2019-03-07 ENCOUNTER — OFFICE VISIT (OUTPATIENT)
Dept: ORTHOPEDIC SURGERY | Age: 54
End: 2019-03-07

## 2019-03-07 VITALS
HEIGHT: 66 IN | BODY MASS INDEX: 19.45 KG/M2 | SYSTOLIC BLOOD PRESSURE: 107 MMHG | HEART RATE: 73 BPM | DIASTOLIC BLOOD PRESSURE: 67 MMHG | WEIGHT: 121.03 LBS

## 2019-03-07 DIAGNOSIS — Z98.890 STATUS POST RIGHT ROTATOR CUFF REPAIR: Primary | ICD-10-CM

## 2019-03-07 DIAGNOSIS — Z98.890 S/P SHOULDER SURGERY: ICD-10-CM

## 2019-03-07 PROCEDURE — 99024 POSTOP FOLLOW-UP VISIT: CPT | Performed by: ORTHOPAEDIC SURGERY

## 2019-03-08 ENCOUNTER — TREATMENT (OUTPATIENT)
Dept: PHYSICAL THERAPY | Age: 54
End: 2019-03-08
Payer: COMMERCIAL

## 2019-03-08 DIAGNOSIS — M25.511 PAIN IN JOINT OF RIGHT SHOULDER: ICD-10-CM

## 2019-03-08 DIAGNOSIS — M25.611 DECREASED ROM OF RIGHT SHOULDER: ICD-10-CM

## 2019-03-08 DIAGNOSIS — M75.111 PARTIAL NONTRAUMATIC TEAR OF ROTATOR CUFF, RIGHT: ICD-10-CM

## 2019-03-08 DIAGNOSIS — M75.01 ADHESIVE CAPSULITIS OF RIGHT SHOULDER: Primary | ICD-10-CM

## 2019-03-08 PROCEDURE — 97110 THERAPEUTIC EXERCISES: CPT | Performed by: PHYSICAL THERAPIST

## 2019-03-08 PROCEDURE — 97140 MANUAL THERAPY 1/> REGIONS: CPT | Performed by: PHYSICAL THERAPIST

## 2019-03-08 PROCEDURE — 97112 NEUROMUSCULAR REEDUCATION: CPT | Performed by: PHYSICAL THERAPIST

## 2019-03-11 ENCOUNTER — TELEPHONE (OUTPATIENT)
Dept: ORTHOPEDIC SURGERY | Age: 54
End: 2019-03-11

## 2019-03-12 ENCOUNTER — TREATMENT (OUTPATIENT)
Dept: PHYSICAL THERAPY | Age: 54
End: 2019-03-12
Payer: COMMERCIAL

## 2019-03-12 DIAGNOSIS — M25.611 DECREASED ROM OF RIGHT SHOULDER: ICD-10-CM

## 2019-03-12 DIAGNOSIS — M25.511 PAIN IN JOINT OF RIGHT SHOULDER: ICD-10-CM

## 2019-03-12 DIAGNOSIS — M75.01 ADHESIVE CAPSULITIS OF RIGHT SHOULDER: Primary | ICD-10-CM

## 2019-03-12 DIAGNOSIS — M75.111 PARTIAL NONTRAUMATIC TEAR OF ROTATOR CUFF, RIGHT: ICD-10-CM

## 2019-03-12 PROCEDURE — 97110 THERAPEUTIC EXERCISES: CPT | Performed by: PHYSICAL THERAPIST

## 2019-03-12 PROCEDURE — 97112 NEUROMUSCULAR REEDUCATION: CPT | Performed by: PHYSICAL THERAPIST

## 2019-03-12 PROCEDURE — 97140 MANUAL THERAPY 1/> REGIONS: CPT | Performed by: PHYSICAL THERAPIST

## 2019-03-12 RX ORDER — METHYLPREDNISOLONE 4 MG/1
TABLET ORAL
Qty: 1 KIT | Refills: 0 | Status: SHIPPED | OUTPATIENT
Start: 2019-03-12 | End: 2019-03-12 | Stop reason: CLARIF

## 2019-03-15 ENCOUNTER — TREATMENT (OUTPATIENT)
Dept: PHYSICAL THERAPY | Age: 54
End: 2019-03-15
Payer: COMMERCIAL

## 2019-03-15 DIAGNOSIS — M75.01 ADHESIVE CAPSULITIS OF RIGHT SHOULDER: Primary | ICD-10-CM

## 2019-03-15 DIAGNOSIS — M75.111 PARTIAL NONTRAUMATIC TEAR OF ROTATOR CUFF, RIGHT: ICD-10-CM

## 2019-03-15 DIAGNOSIS — M25.511 PAIN IN JOINT OF RIGHT SHOULDER: ICD-10-CM

## 2019-03-15 DIAGNOSIS — M25.611 DECREASED ROM OF RIGHT SHOULDER: ICD-10-CM

## 2019-03-15 PROCEDURE — 97110 THERAPEUTIC EXERCISES: CPT | Performed by: PHYSICAL THERAPIST

## 2019-03-15 PROCEDURE — 97112 NEUROMUSCULAR REEDUCATION: CPT | Performed by: PHYSICAL THERAPIST

## 2019-03-15 PROCEDURE — 97140 MANUAL THERAPY 1/> REGIONS: CPT | Performed by: PHYSICAL THERAPIST

## 2019-03-19 ENCOUNTER — TREATMENT (OUTPATIENT)
Dept: PHYSICAL THERAPY | Age: 54
End: 2019-03-19
Payer: COMMERCIAL

## 2019-03-19 DIAGNOSIS — M25.611 DECREASED ROM OF RIGHT SHOULDER: ICD-10-CM

## 2019-03-19 DIAGNOSIS — M75.01 ADHESIVE CAPSULITIS OF RIGHT SHOULDER: Primary | ICD-10-CM

## 2019-03-19 DIAGNOSIS — M75.111 PARTIAL NONTRAUMATIC TEAR OF ROTATOR CUFF, RIGHT: ICD-10-CM

## 2019-03-19 DIAGNOSIS — M25.511 PAIN IN JOINT OF RIGHT SHOULDER: ICD-10-CM

## 2019-03-19 PROCEDURE — 97112 NEUROMUSCULAR REEDUCATION: CPT | Performed by: PHYSICAL THERAPIST

## 2019-03-19 PROCEDURE — 97110 THERAPEUTIC EXERCISES: CPT | Performed by: PHYSICAL THERAPIST

## 2019-03-19 PROCEDURE — 97140 MANUAL THERAPY 1/> REGIONS: CPT | Performed by: PHYSICAL THERAPIST

## 2019-03-22 ENCOUNTER — TREATMENT (OUTPATIENT)
Dept: PHYSICAL THERAPY | Age: 54
End: 2019-03-22
Payer: COMMERCIAL

## 2019-03-22 DIAGNOSIS — M75.01 ADHESIVE CAPSULITIS OF RIGHT SHOULDER: Primary | ICD-10-CM

## 2019-03-22 DIAGNOSIS — M75.111 PARTIAL NONTRAUMATIC TEAR OF ROTATOR CUFF, RIGHT: ICD-10-CM

## 2019-03-22 DIAGNOSIS — M25.611 DECREASED ROM OF RIGHT SHOULDER: ICD-10-CM

## 2019-03-22 DIAGNOSIS — M25.511 PAIN IN JOINT OF RIGHT SHOULDER: ICD-10-CM

## 2019-03-22 PROCEDURE — 97112 NEUROMUSCULAR REEDUCATION: CPT | Performed by: PHYSICAL THERAPIST

## 2019-03-22 PROCEDURE — 97140 MANUAL THERAPY 1/> REGIONS: CPT | Performed by: PHYSICAL THERAPIST

## 2019-03-22 PROCEDURE — 97530 THERAPEUTIC ACTIVITIES: CPT | Performed by: PHYSICAL THERAPIST

## 2019-03-22 PROCEDURE — 97110 THERAPEUTIC EXERCISES: CPT | Performed by: PHYSICAL THERAPIST

## 2019-03-26 ENCOUNTER — TREATMENT (OUTPATIENT)
Dept: PHYSICAL THERAPY | Age: 54
End: 2019-03-26
Payer: COMMERCIAL

## 2019-03-26 DIAGNOSIS — M75.01 ADHESIVE CAPSULITIS OF RIGHT SHOULDER: Primary | ICD-10-CM

## 2019-03-26 DIAGNOSIS — M25.611 DECREASED ROM OF RIGHT SHOULDER: ICD-10-CM

## 2019-03-26 DIAGNOSIS — M75.111 PARTIAL NONTRAUMATIC TEAR OF ROTATOR CUFF, RIGHT: ICD-10-CM

## 2019-03-26 DIAGNOSIS — M25.511 PAIN IN JOINT OF RIGHT SHOULDER: ICD-10-CM

## 2019-03-26 PROCEDURE — 97530 THERAPEUTIC ACTIVITIES: CPT | Performed by: PHYSICAL THERAPIST

## 2019-03-26 PROCEDURE — 97110 THERAPEUTIC EXERCISES: CPT | Performed by: PHYSICAL THERAPIST

## 2019-03-26 PROCEDURE — 97140 MANUAL THERAPY 1/> REGIONS: CPT | Performed by: PHYSICAL THERAPIST

## 2019-03-26 PROCEDURE — 97112 NEUROMUSCULAR REEDUCATION: CPT | Performed by: PHYSICAL THERAPIST

## 2019-03-28 ENCOUNTER — OFFICE VISIT (OUTPATIENT)
Dept: ORTHOPEDIC SURGERY | Age: 54
End: 2019-03-28

## 2019-03-28 VITALS
HEIGHT: 66 IN | BODY MASS INDEX: 19.45 KG/M2 | SYSTOLIC BLOOD PRESSURE: 120 MMHG | HEART RATE: 65 BPM | DIASTOLIC BLOOD PRESSURE: 68 MMHG | WEIGHT: 121.03 LBS

## 2019-03-28 DIAGNOSIS — M25.512 CHRONIC PAIN OF BOTH SHOULDERS: ICD-10-CM

## 2019-03-28 DIAGNOSIS — M25.511 CHRONIC PAIN OF BOTH SHOULDERS: ICD-10-CM

## 2019-03-28 DIAGNOSIS — Z98.890 S/P SHOULDER SURGERY: Primary | ICD-10-CM

## 2019-03-28 DIAGNOSIS — M75.81 RIGHT ROTATOR CUFF TENDONITIS: ICD-10-CM

## 2019-03-28 DIAGNOSIS — G89.29 CHRONIC PAIN OF BOTH SHOULDERS: ICD-10-CM

## 2019-03-28 DIAGNOSIS — Z98.890 STATUS POST RIGHT ROTATOR CUFF REPAIR: ICD-10-CM

## 2019-03-28 PROCEDURE — 99024 POSTOP FOLLOW-UP VISIT: CPT | Performed by: ORTHOPAEDIC SURGERY

## 2019-04-02 ENCOUNTER — TREATMENT (OUTPATIENT)
Dept: PHYSICAL THERAPY | Age: 54
End: 2019-04-02
Payer: COMMERCIAL

## 2019-04-02 DIAGNOSIS — M75.111 PARTIAL NONTRAUMATIC TEAR OF ROTATOR CUFF, RIGHT: ICD-10-CM

## 2019-04-02 DIAGNOSIS — M75.01 ADHESIVE CAPSULITIS OF RIGHT SHOULDER: Primary | ICD-10-CM

## 2019-04-02 DIAGNOSIS — M25.611 DECREASED ROM OF RIGHT SHOULDER: ICD-10-CM

## 2019-04-02 DIAGNOSIS — M25.511 PAIN IN JOINT OF RIGHT SHOULDER: ICD-10-CM

## 2019-04-02 PROCEDURE — 97530 THERAPEUTIC ACTIVITIES: CPT | Performed by: PHYSICAL THERAPIST

## 2019-04-02 PROCEDURE — 97110 THERAPEUTIC EXERCISES: CPT | Performed by: PHYSICAL THERAPIST

## 2019-04-02 PROCEDURE — 97140 MANUAL THERAPY 1/> REGIONS: CPT | Performed by: PHYSICAL THERAPIST

## 2019-04-02 PROCEDURE — 97112 NEUROMUSCULAR REEDUCATION: CPT | Performed by: PHYSICAL THERAPIST

## 2019-04-02 NOTE — FLOWSHEET NOTE
[facility]   Phone: [phone]    Fax: [phone]        Physical Therapy Daily Treatment Note  Date:  2019    Patient Name:  Nolberto Garcia    :  1965  MRN: [id number]  Medical/Treatment Diagnosis Information:  · Diagnosis: R partial RTC tear, bursitis, and adhesive capsulitis  · DOS: 3/08/31  Insurance/Certification information:  PT Insurance Information: Medical [place]  Physician Information:  Referring Practitioner: Dr. Winston Boudreaux of care signed (Y/N): yes 3/12/19    Date of Patient follow up with Physician: 19    Date Applied:  3/12/19    Functional Assessment Tool Used: UEFI  Score: , 83.75% limitation  Functional Limitation: Carrying, moving and handling objects      Progress Note: []  Yes  [x]  No  Next due by: 19      Latex Allergy:  [x]NO      []YES  Preferred Language for Healthcare:   [x]English       []other:    Visit # Insurance Allowable   14 35     Pain level:  4-5/10 (stiffness)    SUBJECTIVE:  Patient reports that R shoulder continues to improve, though she is still very stiff in the morning. She notes that she saw Dr. Jess Sandhu last week and he is pleased with how she is progressing.     >7 week postop    OBJECTIVE: See eval             ROM PROM AROM  Comment:  3/12/18     L R L R     Flexion    117° 155° NT     Abduction     160° NT     ER   25° T4 NT     IR     T3 NT     Other             Other                    Strength: deferred secondary to surgery L R Comment:  3/12/19         Special Tests Results/Comment: deferred secondary to surgery:  3/13/19          RESTRICTIONS/PRECAUTIONS: R RTC repair, SAD, MANISH on 19; ROM restrictions: flexion to 140°, ER to 40°    Exercises:  Exercise/Equipment Resistance/Repetitions Comments   Cardio/Warm-up     Bike          Stretching/PROM     Wand 10x10\" ER at 0° abd to 45°   Table Slides 10x10\" Flex to 140°   UE [place] 10x10\" flex   Table walk back 10x10\"    Pulleys 4'    Pendulum 30x F/b, s/s      Wrist 4 way 20x3#    UT stretch 10x10\"    Doorway ER 10x10\" At 0° abd   BB adduction 10x10\"         STRENGTH     Isometrics     Retraction 20x5\"    Shrugs 20x5\"     Blue, 3'    Finger extension Rubber band, 3'    Weight shift     Flexion     Abduction     External Rotation     Internal Rotation     Biceps 3#, 2x10    Triceps          PRE's     Flexion     Abduction     External Rotation     Internal Rotation     Shrugs     EXT     Reverse Flys     Serratus     Horizontal Abd with ER     Biceps     Triceps     Retraction          Cable Column/Theraband     External Rotation     Internal Rotation     Shrugs     Lats     Ext     Flex     Scapular Retraction Yellow, 2x10    BIC     TRIC     PNF          Neuromuscular Re-ed     Dynamic Stability                              [place]                    Manual/Modalities       , Manual PROM flexion and ER of R shoulder 8'                    Therapeutic Exercise and NMR EXR  [x] (06040) Provided verbal/tactile cueing for activities related to strengthening, flexibility, endurance, ROM  for improvements in scapular, scapulothoracic and UE control with self care, reaching, carrying, lifting, house/yardwork, driving/computer work. [x] (30115) Provided verbal/tactile cueing for activities related to improving balance, coordination, kinesthetic sense, posture, motor skill, proprioception  to assist with  scapular, scapulothoracic and UE control with self care, reaching, carrying, lifting, house/yardwork, driving/computer work. Therapeutic Activities:    [] (98557 or 32047) Provided verbal/tactile cueing for activities related to improving balance, coordination, kinesthetic sense, posture, motor skill, proprioception and motor activation to allow for proper function of scapular, scapulothoracic and UE control with self care, carrying, lifting, driving/computer work.      Home Exercise Program:    [x] (22418) Reviewed/Progressed HEP activities related to strengthening, flexibility, endurance, ROM of scapular, scapulothoracic and UE control with self care, reaching, carrying, lifting, house/yardwork, driving/computer work  [] (87712) Reviewed/Progressed HEP activities related to improving balance, coordination, kinesthetic sense, posture, motor skill, proprioception of scapular, scapulothoracic and UE control with self care, reaching, carrying, lifting, house/yardwork, driving/computer work      Manual Treatments:  PROM / STM / Oscillations-Mobs:  G-I, II, III, IV (PA's, Inf., Post.)  [x] (02684) Provided manual therapy to mobilize soft tissue/joints of cervical/CT, scapular GHJ and UE for the purpose of modulating pain, promoting relaxation,  increasing ROM, reducing/eliminating soft tissue swelling/inflammation/restriction, improving soft tissue extensibility and allowing for proper ROM for normal function with self care, reaching, carrying, lifting, house/yardwork, driving/computer work    Modalities:  CP to R shoulder x10'     Charges:  Timed Code Treatment Minutes: 55   Total Treatment Minutes: 78     [] EVAL (LOW) 60383 (typically 20 minutes face-to-face)  [] EVAL (MOD) 07655 (typically 30 minutes face-to-face)  [] EVAL (HIGH) 08471 (typically 45 minutes face-to-face)  [] RE-EVAL   [x] ZR(86150) x  1   [] IONTO    [x] NMR (07810) x  1   [] VASO  [x] Manual (66275) x  1    [] Other:  [x] TA (96680) x  1    [] Mech Traction (72842)  [] ES(attended) (40185)      [] ES (un) (46974):     GOALS:  Patient stated goal: Able to play golf without increased pain in the R shoulder.     Therapist goals for Patient:   Short Term Goals: To be achieved in: 2 weeks  1. Independent in HEP and progression per patient tolerance, in order to prevent re-injury. MET  2. Patient will have a decrease in pain to facilitate improvement in movement, function, and ADLs as indicated by Functional Deficits. ONGOING     Long Term Goals: To be achieved in: 6 weeks  1.  Disability index score of 10% or less for the UEFI to assist with reaching prior level of function. ONGOING  2. Patient will demonstrate increased R shoulder flexion, abduction, ER, and IR AROM to >160°, >160°, T3, and >T8 respectively, to allow for proper joint functioning as indicated by patients Functional Deficits. ONGOING  3. Patient will demonstrate an increase in R shoulder strength in all planes by a half grade or greater to allow for proper functional mobility as indicated by patients Functional Deficits. ONGOING  4. Patient will return to all functional activities without increased symptoms or restriction. ONGOING  5. Patient will be able to complete bathing and dressing activities with R UE without increased pain in the R shoulder so that she may return to PLOF for all ADLs. ONGOING  6. Patient will be able to play 9 holes of golf without increased pain in the R shoulder so that she may return to PLOF. ONGOING     Progression Towards Functional goals:  [x] Patient is progressing as expected towards functional goals listed. [] Progression is slowed due to complexities listed. [] Progression has been slowed due to co-morbidities. [] Plan just implemented, too soon to assess goals progression  [] Other:     ASSESSMENT:  Patient demonstrates improving R shoulder flexion passive and AAROM in flexion, noted during table slides and pulleys and manual stretching by PT today. She was instructed to add pulleys to her HEP. She tolerated BB adduction stretch moderately well, requiring moderate VCs to achieve and maintain proper form.     Treatment/Activity Tolerance:  [x] Patient tolerated treatment well [] Patient limited by fatique  [] Patient limited by pain  [] Patient limited by other medical complications  [] Other:     Prognosis: [x] Good [] Fair  [] Poor    Patient Requires Follow-up: [x] Yes  [] No    PLAN: See laura  [x] Continue per plan of care [] Alter current plan (see comments)  [] Plan of care initiated [] Hold pending MD visit [] Discharge    Electronically signed by: Domingo Painting PT, DPT, OMT-C      [facility] PT license: [id number]  [place] PT license: [id number]

## 2019-04-05 ENCOUNTER — TREATMENT (OUTPATIENT)
Dept: PHYSICAL THERAPY | Age: 54
End: 2019-04-05
Payer: COMMERCIAL

## 2019-04-05 DIAGNOSIS — M75.01 ADHESIVE CAPSULITIS OF RIGHT SHOULDER: Primary | ICD-10-CM

## 2019-04-05 DIAGNOSIS — M25.611 DECREASED ROM OF RIGHT SHOULDER: ICD-10-CM

## 2019-04-05 DIAGNOSIS — M75.111 PARTIAL NONTRAUMATIC TEAR OF ROTATOR CUFF, RIGHT: ICD-10-CM

## 2019-04-05 DIAGNOSIS — M25.511 PAIN IN JOINT OF RIGHT SHOULDER: ICD-10-CM

## 2019-04-05 PROCEDURE — 97530 THERAPEUTIC ACTIVITIES: CPT | Performed by: PHYSICAL THERAPIST

## 2019-04-05 PROCEDURE — 97140 MANUAL THERAPY 1/> REGIONS: CPT | Performed by: PHYSICAL THERAPIST

## 2019-04-05 PROCEDURE — 97110 THERAPEUTIC EXERCISES: CPT | Performed by: PHYSICAL THERAPIST

## 2019-04-05 NOTE — FLOWSHEET NOTE
[facility]   Phone: [phone]    Fax: [phone]        Physical Therapy Daily Treatment Note  Date:  2019    Patient Name:  Chancey Alpers    :  1965  MRN: [id number]  Medical/Treatment Diagnosis Information:  · Diagnosis: R partial RTC tear, bursitis, and adhesive capsulitis  · DOS: 3/47/51  Insurance/Certification information:  PT Insurance Information: Medical [place]  Physician Information:  Referring Practitioner: Dr. 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  She reports that she has added pulleys to her HEP and this seems to be helping her a great deal.    >7 week postop    OBJECTIVE: See eval             ROM PROM AROM  Comment:  3/12/18     L R L R     Flexion    117° 155° NT     Abduction     160° NT     ER   25° T4 NT     IR     T3 NT     Other             Other                    Strength: deferred secondary to surgery L R Comment:  3/12/19         Special Tests Results/Comment: deferred secondary to surgery:  3/13/19          RESTRICTIONS/PRECAUTIONS: R RTC repair, SAD, MANISH on 19; ROM restrictions: flexion to 140°, ER to 40°    Exercises:  Exercise/Equipment Resistance/Repetitions Comments   Cardio/Warm-up     Bike          Stretching/PROM     Wand 10x10\" ER at 0° abd to 45°   Table Slides 10x10\" Flex to 140°   UE [place] 10x10\" flex   Table walk back 10x10\"    Pulleys 5'    Pendulum  30x F/b, s/s      Wrist 4 way 20x3#    UT stretch 10x10\"    Doorway ER 10x10\" At 0° abd   BB adduction 10x10\"         STRENGTH     Isometrics     Retraction 20x5\"    Shrugs 20x5\"     Blue, 3'    Finger extension Rubber band, 3'    Weight shift     Flexion     Abduction     External Rotation     Internal Rotation     Biceps 3#, 2x10    Triceps          PRE's     Flexion     Abduction     External Rotation     Internal Rotation     Shrugs     EXT     Reverse Flys     Serratus     Horizontal Abd with ER     Biceps     Triceps     Retraction          Cable Column/Theraband     External Rotation     Internal Rotation     Shrugs     Lats     Ext     Flex     Scapular Retraction Yellow, 2x10    BIC     TRIC     PNF          Neuromuscular Re-ed     Dynamic Stability                              Plyoback                    Manual/Modalities       Gentle manual STM to R pec, Manual PROM flexion and ER of R shoulder 8'                    Therapeutic Exercise and NMR EXR  [x] (08670) Provided verbal/tactile cueing for activities related to strengthening, flexibility, endurance, ROM  for improvements in scapular, scapulothoracic and UE control with self care, reaching, carrying, lifting, house/yardwork, driving/computer work. [x] (50641) Provided verbal/tactile cueing for activities related to improving balance, coordination, kinesthetic sense, posture, motor skill, proprioception  to assist with  scapular, scapulothoracic and UE control with self care, reaching, carrying, lifting, house/yardwork, driving/computer work. Therapeutic Activities:    [] (33988 or 02307) Provided verbal/tactile cueing for activities related to improving balance, coordination, kinesthetic sense, posture, motor skill, proprioception and motor activation to allow for proper function of scapular, scapulothoracic and UE control with self care, carrying, lifting, driving/computer work.      Home Exercise Program: [x] (97296) Reviewed/Progressed HEP activities related to strengthening, flexibility, endurance, ROM of scapular, scapulothoracic and UE control with self care, reaching, carrying, lifting, house/yardwork, driving/computer work  [] (66573) Reviewed/Progressed HEP activities related to improving balance, coordination, kinesthetic sense, posture, motor skill, proprioception of scapular, scapulothoracic and UE control with self care, reaching, carrying, lifting, house/yardwork, driving/computer work      Manual Treatments:  PROM / STM / Oscillations-Mobs:  G-I, II, III, IV (PA's, Inf., Post.)  [x] (80644) Provided manual therapy to mobilize soft tissue/joints of cervical/CT, scapular GHJ and UE for the purpose of modulating pain, promoting relaxation,  increasing ROM, reducing/eliminating soft tissue swelling/inflammation/restriction, improving soft tissue extensibility and allowing for proper ROM for normal function with self care, reaching, carrying, lifting, house/yardwork, driving/computer work    Modalities:  CP to R shoulder x10'     Charges:  Timed Code Treatment Minutes: 50   Total Treatment Minutes: 75     [] EVAL (LOW) 05801 (typically 20 minutes face-to-face)  [] EVAL (MOD) 49184 (typically 30 minutes face-to-face)  [] EVAL (HIGH) 01973 (typically 45 minutes face-to-face)  [] RE-EVAL   [x] XS(15514) x  1   [] IONTO    [] NMR (80263) x      [] VASO  [x] Manual (37447) x  1    [] Other:  [x] TA (23050) x  1    [] [facility] Traction (68034)  [] ES(attended) (71857)      [] ES (un) (16360):     GOALS:  Patient stated goal: Able to play golf without increased pain in the R shoulder.     Therapist goals for Patient:   Short Term Goals: To be achieved in: 2 weeks  1. Independent in HEP and progression per patient tolerance, in order to prevent re-injury. MET  2. Patient will have a decrease in pain to facilitate improvement in movement, function, and ADLs as indicated by Functional Deficits.  ONGOING     Long Term Goals: To be achieved in: 6 weeks  1. Disability index score of 10% or less for the UEFI to assist with reaching prior level of function. ONGOING  2. Patient will demonstrate increased R shoulder flexion, abduction, ER, and IR AROM to >160°, >160°, T3, and >T8 respectively, to allow for proper joint functioning as indicated by patients Functional Deficits. ONGOING  3. Patient will demonstrate an increase in R shoulder strength in all planes by a half grade or greater to allow for proper functional mobility as indicated by patients Functional Deficits. ONGOING  4. Patient will return to all functional activities without increased symptoms or restriction. ONGOING  5. Patient will be able to complete bathing and dressing activities with R UE without increased pain in the R shoulder so that she may return to PLOF for all ADLs. ONGOING  6. Patient will be able to play 9 holes of golf without increased pain in the R shoulder so that she may return to PLOF. ONGOING     Progression Towards Functional goals:  [x] Patient is progressing as expected towards functional goals listed. [] Progression is slowed due to complexities listed. [] Progression has been slowed due to co-morbidities. [] Plan just implemented, too soon to assess goals progression  [] Other:     ASSESSMENT:  Patient exhibits mild increase in muscle guarding with manual flexion PROM stretching and increase in R pec muscle tightness today. She continues to demonstrate improvements in passive and AAROM of the R shoulder, noted throughout session. She requires moderate cuing for proper form and initiating BB adduction stretch.     Treatment/Activity Tolerance:  [x] Patient tolerated treatment well [] Patient limited by fatique  [] Patient limited by pain  [] Patient limited by other medical complications  [] Other:     Prognosis: [x] Good [] Fair  [] Poor    Patient Requires Follow-up: [x] Yes  [] No    PLAN: See laura  [x] Continue per plan of care [] Claudia Trujillo current plan (see comments)  [] Plan of care initiated [] Hold pending MD visit [] Discharge    Electronically signed by: Liborio Viramontes PT, DPT, OMT-C      [id number] [address] PT license: [id number]  [place] PT license: [id number]

## 2019-04-09 ENCOUNTER — TREATMENT (OUTPATIENT)
Dept: PHYSICAL THERAPY | Age: 54
End: 2019-04-09
Payer: COMMERCIAL

## 2019-04-09 DIAGNOSIS — M75.111 PARTIAL NONTRAUMATIC TEAR OF ROTATOR CUFF, RIGHT: ICD-10-CM

## 2019-04-09 DIAGNOSIS — M25.611 DECREASED ROM OF RIGHT SHOULDER: ICD-10-CM

## 2019-04-09 DIAGNOSIS — M75.01 ADHESIVE CAPSULITIS OF RIGHT SHOULDER: Primary | ICD-10-CM

## 2019-04-09 DIAGNOSIS — M25.511 PAIN IN JOINT OF RIGHT SHOULDER: ICD-10-CM

## 2019-04-09 PROCEDURE — 97530 THERAPEUTIC ACTIVITIES: CPT | Performed by: PHYSICAL THERAPIST

## 2019-04-09 PROCEDURE — 97140 MANUAL THERAPY 1/> REGIONS: CPT | Performed by: PHYSICAL THERAPIST

## 2019-04-09 PROCEDURE — 97110 THERAPEUTIC EXERCISES: CPT | Performed by: PHYSICAL THERAPIST

## 2019-04-09 PROCEDURE — 97112 NEUROMUSCULAR REEDUCATION: CPT | Performed by: PHYSICAL THERAPIST

## 2019-04-09 NOTE — FLOWSHEET NOTE
[facility]   Phone: [phone]    Fax: [phone]        Physical Therapy Daily Treatment Note  Date:  2019    Patient Name:  Monica Copeland    :  1965  MRN: [id number]  Medical/Treatment Diagnosis Information:  · Diagnosis: R partial RTC tear, bursitis, and adhesive capsulitis  · DOS: 37  Insurance/Certification information:  PT Insurance Information: Medical [place]  Physician Information:  Referring Practitioner: Dr. Palomo Cain of care signed (Y/N): yes 3/12/19    Date of Patient follow up with Physician: 19    Date Applied:  3/12/19    Functional Assessment Tool Used: UEFI  Score: , 83.75% limitation  Functional Limitation: Carrying, moving and handling objects      Progress Note: []  Yes  [x]  No  Next due by: 19      Latex Allergy:  [x]NO      []YES  Preferred Language for Healthcare:   [x]English       []other:    Visit # Insurance Allowable   16 35     Pain level:  5/10     SUBJECTIVE:  Patient reports that she is seeing a big decrease in the stiffness of the R shoulder which is a great relief to her. She does note that she has moderate muscle soreness in the posterior R shoulder and the biceps.     >8 week postop    OBJECTIVE: See eval             ROM PROM AROM  Comment:  3/12/18     L R L R     Flexion    117° 155° NT     Abduction     160° NT     ER   25° T4 NT     IR     T3 NT     Other             Other                    Strength: deferred secondary to surgery L R Comment:  3/12/19         Special Tests Results/Comment: deferred secondary to surgery:  3/13/19          RESTRICTIONS/PRECAUTIONS: R RTC repair, SAD, MANISH on 19; ROM restrictions: flexion to 140°, ER to 40°    Exercises:  Exercise/Equipment Resistance/Repetitions Comments   Cardio/Warm-up     Bike          Stretching/PROM     Wand 10x10\" ER at 0° abd to 45°   Table Slides 10x10\" Flex to 140°   Wall slides 10x10\"    UE [place] 10x10\" flex   Table walk back 10x10\"    Pulleys 5'    Pendulum  30x F/b, s/s      Wrist 4 way 20x3#    UT stretch 10x10\"    Doorway ER 10x10\" At 0° abd   BB adduction 10x10\"         STRENGTH     Isometrics     Retraction 20x5\"    Shrugs 20x5\"     Blue, 3'    Finger extension Rubber band, 3'    Weight shift     Flexion     Abduction     External Rotation     Internal Rotation     Biceps 3#, 2x10    Triceps          PRE's     Flexion     Abduction     External Rotation     Internal Rotation     Shrugs     EXT     Reverse Flys     Serratus     Horizontal Abd with ER     Biceps     Triceps     Retraction          Cable Column/Theraband     External Rotation     Internal Rotation     Shrugs     Lats     Ext     Flex     Scapular Retraction Yellow, 2x10    BIC     TRIC     PNF          Neuromuscular Re-ed     Dynamic Stability                              [place]                    Manual/Modalities       Gentle manual STM to R pec, Manual PROM flexion and ER of R shoulder 8'                    Therapeutic Exercise and NMR EXR  [x] (36728) Provided verbal/tactile cueing for activities related to strengthening, flexibility, endurance, ROM  for improvements in scapular, scapulothoracic and UE control with self care, reaching, carrying, lifting, house/yardwork, driving/computer work. [x] (27747) Provided verbal/tactile cueing for activities related to improving balance, coordination, kinesthetic sense, posture, motor skill, proprioception  to assist with  scapular, scapulothoracic and UE control with self care, reaching, carrying, lifting, house/yardwork, driving/computer work. Therapeutic Activities:    [] (32705 or 05297) Provided verbal/tactile cueing for activities related to improving balance, coordination, kinesthetic sense, posture, motor skill, proprioception and motor activation to allow for proper function of scapular, scapulothoracic and UE control with self care, carrying, lifting, driving/computer work.      Home Exercise Program: [x] (68793) Reviewed/Progressed HEP activities related to strengthening, flexibility, endurance, ROM of scapular, scapulothoracic and UE control with self care, reaching, carrying, lifting, house/yardwork, driving/computer work  [] (11069) Reviewed/Progressed HEP activities related to improving balance, coordination, kinesthetic sense, posture, motor skill, proprioception of scapular, scapulothoracic and UE control with self care, reaching, carrying, lifting, house/yardwork, driving/computer work      Manual Treatments:  PROM / STM / Oscillations-Mobs:  G-I, II, III, IV (PA's, Inf., Post.)  [x] (57677) Provided manual therapy to mobilize soft tissue/joints of cervical/CT, scapular GHJ and UE for the purpose of modulating pain, promoting relaxation,  increasing ROM, reducing/eliminating soft tissue swelling/inflammation/restriction, improving soft tissue extensibility and allowing for proper ROM for normal function with self care, reaching, carrying, lifting, house/yardwork, driving/computer work    Modalities:  CP to R shoulder x10'     Charges:  Timed Code Treatment Minutes: 55   Total Treatment Minutes: 86     [] EVAL (LOW) 05726 (typically 20 minutes face-to-face)  [] EVAL (MOD) 22816 (typically 30 minutes face-to-face)  [] EVAL (HIGH) 87527 (typically 45 minutes face-to-face)  [] RE-EVAL   [x] QG(15068) x  1   [] IONTO    [x] NMR (67412) x  1   [] VASO  [x] Manual (20385) x  1    [] Other:  [x] TA (05120) x  1    [] [facility] Traction (50906)  [] ES(attended) (32400)      [] ES (un) (29224):     GOALS:  Patient stated goal: Able to play golf without increased pain in the R shoulder.     Therapist goals for Patient:   Short Term Goals: To be achieved in: 2 weeks  1. Independent in HEP and progression per patient tolerance, in order to prevent re-injury. MET  2. Patient will have a decrease in pain to facilitate improvement in movement, function, and ADLs as indicated by Functional Deficits.  ONGOING     Long Term Goals: To be achieved in: 6 weeks  1. Disability index score of 10% or less for the UEFI to assist with reaching prior level of function. ONGOING  2. Patient will demonstrate increased R shoulder flexion, abduction, ER, and IR AROM to >160°, >160°, T3, and >T8 respectively, to allow for proper joint functioning as indicated by patients Functional Deficits. ONGOING  3. Patient will demonstrate an increase in R shoulder strength in all planes by a half grade or greater to allow for proper functional mobility as indicated by patients Functional Deficits. ONGOING  4. Patient will return to all functional activities without increased symptoms or restriction. ONGOING  5. Patient will be able to complete bathing and dressing activities with R UE without increased pain in the R shoulder so that she may return to PLOF for all ADLs. ONGOING  6. Patient will be able to play 9 holes of golf without increased pain in the R shoulder so that she may return to PLOF. ONGOING     Progression Towards Functional goals:  [x] Patient is progressing as expected towards functional goals listed. [] Progression is slowed due to complexities listed. [] Progression has been slowed due to co-morbidities. [] Plan just implemented, too soon to assess goals progression  [] Other:     ASSESSMENT:  Patient continues to demonstrate greatest limitations in R shoulder ER ROM, though restrictions remain present in flexion, abduction, and IR as well. PT continues to educate and stress importance of frequent stretching at home. Plan to progress ROM activities per patient tolerance and postop protocol.     Treatment/Activity Tolerance:  [x] Patient tolerated treatment well [] Patient limited by fatique  [] Patient limited by pain  [] Patient limited by other medical complications  [] Other:     Prognosis: [x] Good [] Fair  [] Poor    Patient Requires Follow-up: [x] Yes  [] No    PLAN: See eval  [x] Continue per plan of care [] Alter current plan (see comments)  [] Plan of care initiated [] Hold pending MD visit [] Discharge    Electronically signed by: Dyllan Miguel DPT, RICARDO Moreira PT license: [id number]  [place] PT license: [id number]

## 2019-04-12 ENCOUNTER — TREATMENT (OUTPATIENT)
Dept: PHYSICAL THERAPY | Age: 54
End: 2019-04-12
Payer: COMMERCIAL

## 2019-04-12 DIAGNOSIS — M25.611 DECREASED ROM OF RIGHT SHOULDER: ICD-10-CM

## 2019-04-12 DIAGNOSIS — M75.01 ADHESIVE CAPSULITIS OF RIGHT SHOULDER: Primary | ICD-10-CM

## 2019-04-12 DIAGNOSIS — M25.511 PAIN IN JOINT OF RIGHT SHOULDER: ICD-10-CM

## 2019-04-12 DIAGNOSIS — M75.111 PARTIAL NONTRAUMATIC TEAR OF ROTATOR CUFF, RIGHT: ICD-10-CM

## 2019-04-12 PROCEDURE — 97140 MANUAL THERAPY 1/> REGIONS: CPT | Performed by: PHYSICAL THERAPIST

## 2019-04-12 PROCEDURE — 97112 NEUROMUSCULAR REEDUCATION: CPT | Performed by: PHYSICAL THERAPIST

## 2019-04-12 PROCEDURE — 97110 THERAPEUTIC EXERCISES: CPT | Performed by: PHYSICAL THERAPIST

## 2019-04-12 NOTE — PLAN OF CARE
[facility]   Phone: [phone]    Fax: [phone]   Physical Therapy Re-Certification Plan of Care    Dear Dr. Vinnie Aviles,    We had the pleasure of treating the following patient for physical therapy services at [address] [place] [address]. A summary of our findings can be found in the updated assessment below. This includes our plan of care. If you have any questions or concerns regarding these findings, please do not hesitate to contact me at the office phone number checked above.   Thank you for the referral.     Physician Signature:________________________________Date:__________________  By signing above (or electronic signature), therapists plan is approved by physician      Overall Response to Treatment:   [x]Patient is responding well to treatment and improvement is noted with regards  to goals   []Patient should continue to improve in reasonable time if they continue HEP   []Patient has plateaued and is no longer responding to skilled PT intervention    []Patient is getting worse and would benefit from return to referring MD   []Patient unable to adhere to initial POC   [x]Other: Progress is slow secondary to early postop pain and patient's anxiety/muscle guarding          Physical Therapy Daily Treatment Note  Date:  2019    Patient Name:  Sania Ríos    :  1965  MRN: [id number]  Medical/Treatment Diagnosis Information:  · Diagnosis: R partial RTC tear, bursitis, and adhesive capsulitis  · DOS:   Insurance/Certification information:  PT Insurance Information: Medical [place]  Physician Information:  Referring Practitioner: Dr. Enrico Dubose of care signed (Y/N): sent 19    Date of Patient follow up with Physician: 19    Date Applied:  19  Functional Assessment Tool Used: UEFI  Score: 34/80, 57.5% limitation  Functional Limitation: Carrying, moving and handling objects      Progress Note: [x]  Yes  []  No  Next due by: 5/12/19      Latex Allergy:  [x]NO      []YES  Preferred Language for Healthcare:   [x]English       []other:    Visit # Insurance Allowable   17 35     Pain level:  8/10     SUBJECTIVE:  Patient reports that she thinks she over did it yesterday. She notes that she did her normal HEP routine, an hour of work on the laptop, and then tried to do her 4 mile walk through her neighborhood. She notes that she had to call her  to come pick her up after and hour and 15 minutes because she was having pain and burning in the R shoulder. She notes that burning is still present in anterior R shoulder and R bicep region today.     >8 week postop    OBJECTIVE: See eval             ROM PROM AROM  Comment:  4/12/18     L R L R     Flexion    132° 155° 96°     Abduction     160° 77°     ER   38° T4 Occiput     IR     T3 Center of buttock     Other             Other                    Strength: deferred secondary to surgery L R Comment:  4/12/19         Special Tests Results/Comment: deferred secondary to surgery:  4/13/19          RESTRICTIONS/PRECAUTIONS: R RTC repair, SAD, MANISH on 2/11/19; ROM restrictions: flexion to 140°, ER to 40°    Exercises:  Exercise/Equipment Resistance/Repetitions Comments   Cardio/Warm-up     Bike          Stretching/PROM     Wand 10x10\" ER at 0° abd to 45°   Supine cane ER 10x10\" @ 45° abd   Table Slides 10x10\" Flex to 140°   Wall slides  HOLD   UE [place] 10x10\" flex   Table walk back 10x10\"    Pulleys 5'    Pendulum  30x F/b, s/s      Wrist 4 way 20x3#    UT stretch 10x10\"    Doorway ER 10x10\" At 0° abd   BB adduction 10x10\"         STRENGTH     Isometrics     Retraction 20x5\"    Shrugs 20x5\"     Blue, 3'    Finger extension Rubber band, 3'    Weight shift     Flexion     Abduction     External Rotation     Internal Rotation     Biceps 3#, 2x10    Triceps          PRE's     Flexion     Abduction     External Rotation     Internal Rotation     Shrugs     EXT     Reverse Flys     Serratus Treatments:  PROM / STM / Oscillations-Mobs:  G-I, II, III, IV (PA's, Inf., Post.)  [x] (85756) Provided manual therapy to mobilize soft tissue/joints of cervical/CT, scapular GHJ and UE for the purpose of modulating pain, promoting relaxation,  increasing ROM, reducing/eliminating soft tissue swelling/inflammation/restriction, improving soft tissue extensibility and allowing for proper ROM for normal function with self care, reaching, carrying, lifting, house/yardwork, driving/computer work    Modalities:  CP to R shoulder x10'     Charges:  Timed Code Treatment Minutes: 50   Total Treatment Minutes: 70     [] EVAL (LOW) 99043 (typically 20 minutes face-to-face)  [] EVAL (MOD) 48597 (typically 30 minutes face-to-face)  [] EVAL (HIGH) 24981 (typically 45 minutes face-to-face)  [] RE-EVAL   [x] UU(68525) x  1   [] IONTO    [x] NMR (29517) x  1   [] VASO  [x] Manual (79858) x  1    [] Other:  [] TA (23690) x       [] [employer] Traction (93784)  [] ES(attended) (20250)      [] ES (un) (10101):     GOALS:  Patient stated goal: Able to play golf without increased pain in the R shoulder.     Therapist goals for Patient:   Short Term Goals: To be achieved in: 2 weeks  1. Independent in HEP and progression per patient tolerance, in order to prevent re-injury. MET  2. Patient will have a decrease in pain to facilitate improvement in movement, function, and ADLs as indicated by Functional Deficits. ONGOING     Long Term Goals: To be achieved in: 6 weeks  1. Disability index score of 10% or less for the UEFI to assist with reaching prior level of function. ONGOING  2. Patient will demonstrate increased R shoulder flexion, abduction, ER, and IR AROM to >160°, >160°, T3, and >T8 respectively, to allow for proper joint functioning as indicated by patients Functional Deficits. ONGOING  3.  Patient will demonstrate an increase in R shoulder strength in all planes by a half grade or greater to allow for proper functional mobility as indicated by patients Functional Deficits. ONGOING  4. Patient will return to all functional activities without increased symptoms or restriction. ONGOING  5. Patient will be able to complete bathing and dressing activities with R UE without increased pain in the R shoulder so that she may return to PLOF for all ADLs. ONGOING  6. Patient will be able to play 9 holes of golf without increased pain in the R shoulder so that she may return to PLOF. ONGOING     Progression Towards Functional goals:  [x] Patient is progressing as expected towards functional goals listed. [] Progression is slowed due to complexities listed. [] Progression has been slowed due to co-morbidities. [] Plan just implemented, too soon to assess goals progression  [] Other:     ASSESSMENT:  PT and patient had lengthy discussion on what is an appropriate level of activity and that if she completes her full HEP, walking 4 miles in addition is going to be too much on her shoulder. Progressions were held secondary to significant increase in pain today. She demonstrates slow progress toward all LTGs, with several slow downs secondary to flare ups in pain and guarding. Significant limitations remain present in R shoulder active and passive ROM of the R shoulder. Patient would benefit from continued skilled PT services to address remaining deficits in R shoulder AROM, strength, and function so that she may return to PLOF for all ADLs.     Treatment/Activity Tolerance:  [x] Patient tolerated treatment well [] Patient limited by fatique  [] Patient limited by pain  [] Patient limited by other medical complications  [] Other:     Prognosis: [x] Good [] Fair  [] Poor    Patient Requires Follow-up: [x] Yes  [] No    PLAN: 1-2x/week for 6 weeks  [x] Continue per plan of care [] Alter current plan (see comments)  [] Plan of care initiated [] Hold pending MD visit [] Discharge    Electronically signed by: Nahomy Santiago PT, DPT, OMT-C      [place] PT license: [id number]  [place] PT license: [id number]

## 2019-04-16 ENCOUNTER — TREATMENT (OUTPATIENT)
Dept: PHYSICAL THERAPY | Age: 54
End: 2019-04-16
Payer: COMMERCIAL

## 2019-04-16 DIAGNOSIS — M25.511 PAIN IN JOINT OF RIGHT SHOULDER: ICD-10-CM

## 2019-04-16 DIAGNOSIS — M75.111 PARTIAL NONTRAUMATIC TEAR OF ROTATOR CUFF, RIGHT: ICD-10-CM

## 2019-04-16 DIAGNOSIS — M75.01 ADHESIVE CAPSULITIS OF RIGHT SHOULDER: Primary | ICD-10-CM

## 2019-04-16 DIAGNOSIS — M25.611 DECREASED ROM OF RIGHT SHOULDER: ICD-10-CM

## 2019-04-16 PROCEDURE — 97112 NEUROMUSCULAR REEDUCATION: CPT | Performed by: PHYSICAL THERAPIST

## 2019-04-16 PROCEDURE — 97140 MANUAL THERAPY 1/> REGIONS: CPT | Performed by: PHYSICAL THERAPIST

## 2019-04-16 PROCEDURE — 97530 THERAPEUTIC ACTIVITIES: CPT | Performed by: PHYSICAL THERAPIST

## 2019-04-16 PROCEDURE — 97110 THERAPEUTIC EXERCISES: CPT | Performed by: PHYSICAL THERAPIST

## 2019-04-16 NOTE — FLOWSHEET NOTE
[facility]   Phone: [phone]    Fax: [phone]        Physical Therapy Daily Treatment Note  Date:  2019    Patient Name:  Vida Patricia    :  1965  MRN: [id number]  Medical/Treatment Diagnosis Information:  · Diagnosis: R partial RTC tear, bursitis, and adhesive capsulitis  · DOS: 88  Insurance/Certification information:  PT Insurance Information: Medical [place]  Physician Information:  Referring Practitioner: Dr. Kai Tejeda of care signed (Y/N): sent 19    Date of Patient follow up with Physician: 19    Date Applied:  19  Functional Assessment Tool Used: UEFI  Score: 34/80, 57.5% limitation  Functional Limitation: Carrying, moving and handling objects      Progress Note: [x]  Yes  []  No  Next due by: 19      Latex Allergy:  [x]NO      []YES  Preferred Language for Healthcare:   [x]English       []other:    Visit # Insurance Allowable   18 35     Pain level:  6/10     SUBJECTIVE:  Patient reports that she is still stiff and sore, but notes that she is feeling a lot better today. She reports that she has been working at better adjusting her activity level, and that seems to be helping.     >9 week postop    OBJECTIVE: See eval             ROM PROM AROM  Comment:  18     L R L R     Flexion    132° 155° 96°     Abduction     160° 77°     ER   38° T4 Occiput     IR     T3 Center of buttock     Other             Other                    Strength: deferred secondary to surgery L R Comment:  19         Special Tests Results/Comment: deferred secondary to surgery:  19          RESTRICTIONS/PRECAUTIONS: R RTC repair, SAD, MANISH on 19; ROM restrictions: flexion to 140°, ER to 40°    Exercises:  Exercise/Equipment Resistance/Repetitions Comments   Cardio/Warm-up     Bike          Stretching/PROM     Wand 10x10\" ER at 0° abd to 45°   Supine cane ER 10x10\" @ 45° abd   Table Slides 10x10\" Flex to 140°   Wall slides driving/computer work. Home Exercise Program:    [x] (32748) Reviewed/Progressed HEP activities related to strengthening, flexibility, endurance, ROM of scapular, scapulothoracic and UE control with self care, reaching, carrying, lifting, house/yardwork, driving/computer work  [] (76583) Reviewed/Progressed HEP activities related to improving balance, coordination, kinesthetic sense, posture, motor skill, proprioception of scapular, scapulothoracic and UE control with self care, reaching, carrying, lifting, house/yardwork, driving/computer work      Manual Treatments:  PROM / STM / Oscillations-Mobs:  G-I, II, III, IV (PA's, Inf., Post.)  [x] (29576) Provided manual therapy to mobilize soft tissue/joints of cervical/CT, scapular GHJ and UE for the purpose of modulating pain, promoting relaxation,  increasing ROM, reducing/eliminating soft tissue swelling/inflammation/restriction, improving soft tissue extensibility and allowing for proper ROM for normal function with self care, reaching, carrying, lifting, house/yardwork, driving/computer work    Modalities:  CP to R shoulder x10'     Charges:  Timed Code Treatment Minutes: 55   Total Treatment Minutes: 85     [] EVAL (LOW) 80255 (typically 20 minutes face-to-face)  [] EVAL (MOD) 10499 (typically 30 minutes face-to-face)  [] EVAL (HIGH) 85157 (typically 45 minutes face-to-face)  [] RE-EVAL   [x] TZ(26879) x  1   [] IONTO    [x] NMR (39163) x  1   [] VASO  [x] Manual (52927) x  1    [] Other:  [x] TA (77980) x  1    [] Mech Traction (14489)  [] ES(attended) (46906)      [] ES (un) (68967):     GOALS:  Patient stated goal: Able to play golf without increased pain in the R shoulder.     Therapist goals for Patient:   Short Term Goals: To be achieved in: 2 weeks  1. Independent in HEP and progression per patient tolerance, in order to prevent re-injury. MET  2.  Patient will have a decrease in pain to facilitate improvement in movement, function, and ADLs as indicated by Functional Deficits. ONGOING     Long Term Goals: To be achieved in: 6 weeks  1. Disability index score of 10% or less for the UEFI to assist with reaching prior level of function. ONGOING  2. Patient will demonstrate increased R shoulder flexion, abduction, ER, and IR AROM to >160°, >160°, T3, and >T8 respectively, to allow for proper joint functioning as indicated by patients Functional Deficits. ONGOING  3. Patient will demonstrate an increase in R shoulder strength in all planes by a half grade or greater to allow for proper functional mobility as indicated by patients Functional Deficits. ONGOING  4. Patient will return to all functional activities without increased symptoms or restriction. ONGOING  5. Patient will be able to complete bathing and dressing activities with R UE without increased pain in the R shoulder so that she may return to PLOF for all ADLs. ONGOING  6. Patient will be able to play 9 holes of golf without increased pain in the R shoulder so that she may return to PLOF. ONGOING     Progression Towards Functional goals:  [x] Patient is progressing as expected towards functional goals listed. [] Progression is slowed due to complexities listed. [] Progression has been slowed due to co-morbidities. [] Plan just implemented, too soon to assess goals progression  [] Other:     ASSESSMENT:  Patient demonstrated improved activity tolerance today with ability to complete full program today without reports of increased pain in the R shoulder. Her R shoulder ER PROM continues to improve with new exercises and manual stretching by PT. Plan to progress activity per patient tolerance and postop protocol.     Treatment/Activity Tolerance:  [x] Patient tolerated treatment well [] Patient limited by fatique  [] Patient limited by pain  [] Patient limited by other medical complications  [] Other:     Prognosis: [x] Good [] Fair  [] Poor    Patient Requires Follow-up: [x] Yes  [] No    PLAN: 1-2x/week for 6 weeks  [x] Continue per plan of care [] Alter current plan (see comments)  [] Plan of care initiated [] Hold pending MD visit [] Discharge    Electronically signed by: Eze Darling DPT, OMT-C      [place] PT license: [id number]  [place] PT license: [id number]

## 2019-04-23 ENCOUNTER — TREATMENT (OUTPATIENT)
Dept: PHYSICAL THERAPY | Age: 54
End: 2019-04-23
Payer: COMMERCIAL

## 2019-04-23 DIAGNOSIS — M75.111 PARTIAL NONTRAUMATIC TEAR OF ROTATOR CUFF, RIGHT: ICD-10-CM

## 2019-04-23 DIAGNOSIS — M25.511 PAIN IN JOINT OF RIGHT SHOULDER: ICD-10-CM

## 2019-04-23 DIAGNOSIS — M75.01 ADHESIVE CAPSULITIS OF RIGHT SHOULDER: Primary | ICD-10-CM

## 2019-04-23 DIAGNOSIS — M25.611 DECREASED ROM OF RIGHT SHOULDER: ICD-10-CM

## 2019-04-23 PROCEDURE — 97530 THERAPEUTIC ACTIVITIES: CPT | Performed by: PHYSICAL THERAPIST

## 2019-04-23 PROCEDURE — 97140 MANUAL THERAPY 1/> REGIONS: CPT | Performed by: PHYSICAL THERAPIST

## 2019-04-23 PROCEDURE — 97110 THERAPEUTIC EXERCISES: CPT | Performed by: PHYSICAL THERAPIST

## 2019-04-23 PROCEDURE — 97112 NEUROMUSCULAR REEDUCATION: CPT | Performed by: PHYSICAL THERAPIST

## 2019-04-23 NOTE — FLOWSHEET NOTE
[facility]   Phone: [phone]    Fax: [phone]        Physical Therapy Daily Treatment Note  Date:  2019    Patient Name:  Rosi Fleischer    :  1965  MRN: [id number]  Medical/Treatment Diagnosis Information:  · Diagnosis: R partial RTC tear, bursitis, and adhesive capsulitis  · DOS:   Insurance/Certification information:  PT Insurance Information: Medical [place]  Physician Information:  Referring Practitioner: Dr. [facility] of care signed (Y/N): sent 19    Date of Patient follow up with Physician: 19    Date Applied:  19  Functional Assessment Tool Used: UEFI  Score: 34/80, 57.5% limitation  Functional Limitation: Carrying, moving and handling objects      Progress Note: [x]  Yes  []  No  Next due by: 19      Latex Allergy:  [x]NO      []YES  Preferred Language for Healthcare:   [x]English       []other:    Visit # Insurance Allowable        Pain level:  6/10     SUBJECTIVE:  Patient reports that she drove 5 hours on Friday and 5 hours yesterday, and her shoulder is very sore as a result.     >10 week postop    OBJECTIVE: See eval             ROM PROM AROM  Comment:  18     L R L R     Flexion    132° 155° 96°     Abduction     160° 77°     ER   38° T4 Occiput     IR     T3 Center of buttock     Other             Other                    Strength: deferred secondary to surgery L R Comment:  19         Special Tests Results/Comment: deferred secondary to surgery:  19          RESTRICTIONS/PRECAUTIONS: R RTC repair, SAD, MANISH on 19; ROM restrictions: flexion to 140°, ER to 40°    Exercises:  Exercise/Equipment Resistance/Repetitions Comments   Cardio/Warm-up     Bike          Stretching/PROM     Wand 10x10\" ER at 0° abd to 45°   Supine cane ER 10x10\" @ 45° abd   Table Slides 10x10\" Flex to 140°   Wall slides 10x10\"    UE [place] 10x10\" flex   Table walk back 10x10\"    Pulleys 5'    Pendulum  30x F/b, strengthening, flexibility, endurance, ROM of scapular, scapulothoracic and UE control with self care, reaching, carrying, lifting, house/yardwork, driving/computer work  [] (91467) Reviewed/Progressed HEP activities related to improving balance, coordination, kinesthetic sense, posture, motor skill, proprioception of scapular, scapulothoracic and UE control with self care, reaching, carrying, lifting, house/yardwork, driving/computer work      Manual Treatments:  PROM / STM / Oscillations-Mobs:  G-I, II, III, IV (PA's, Inf., Post.)  [x] (67647) Provided manual therapy to mobilize soft tissue/joints of cervical/CT, scapular GHJ and UE for the purpose of modulating pain, promoting relaxation,  increasing ROM, reducing/eliminating soft tissue swelling/inflammation/restriction, improving soft tissue extensibility and allowing for proper ROM for normal function with self care, reaching, carrying, lifting, house/yardwork, driving/computer work    Modalities:  CP to R shoulder x10'     Charges:  Timed Code Treatment Minutes: 55   Total Treatment Minutes: 83     [] EVAL (LOW) 39411 (typically 20 minutes face-to-face)  [] EVAL (MOD) 17718 (typically 30 minutes face-to-face)  [] EVAL (HIGH) 90005 (typically 45 minutes face-to-face)  [] RE-EVAL   [x] OA(65273) x  1   [] IONTO    [x] NMR (12637) x  1   [] VASO  [x] Manual (30413) x  1    [] Other:  [x] TA (47418) x  1    [] Mech Traction (56592)  [] ES(attended) (24385)      [] ES (un) (06961):     GOALS:  Patient stated goal: Able to play golf without increased pain in the R shoulder.     Therapist goals for Patient:   Short Term Goals: To be achieved in: 2 weeks  1. Independent in HEP and progression per patient tolerance, in order to prevent re-injury. MET  2. Patient will have a decrease in pain to facilitate improvement in movement, function, and ADLs as indicated by Functional Deficits. ONGOING     Long Term Goals: To be achieved in: 6 weeks  1.  Disability index score of 10% or less for the UEFI to assist with reaching prior level of function. ONGOING  2. Patient will demonstrate increased R shoulder flexion, abduction, ER, and IR AROM to >160°, >160°, T3, and >T8 respectively, to allow for proper joint functioning as indicated by patients Functional Deficits. ONGOING  3. Patient will demonstrate an increase in R shoulder strength in all planes by a half grade or greater to allow for proper functional mobility as indicated by patients Functional Deficits. ONGOING  4. Patient will return to all functional activities without increased symptoms or restriction. ONGOING  5. Patient will be able to complete bathing and dressing activities with R UE without increased pain in the R shoulder so that she may return to PLOF for all ADLs. ONGOING  6. Patient will be able to play 9 holes of golf without increased pain in the R shoulder so that she may return to PLOF. ONGOING     Progression Towards Functional goals:  [x] Patient is progressing as expected towards functional goals listed. [] Progression is slowed due to complexities listed. [] Progression has been slowed due to co-morbidities. [] Plan just implemented, too soon to assess goals progression  [] Other:     ASSESSMENT:  Patient exhibits increased muscle guarding today, noted during manual PROM stretching by PT. Progressions were held secondary to patient's reports of increased soreness in the R shoulder. Patient was educated to continue stretching an dicing to reduce increased soreness.     Treatment/Activity Tolerance:  [x] Patient tolerated treatment well [] Patient limited by fatique  [] Patient limited by pain  [] Patient limited by other medical complications  [] Other:     Prognosis: [x] Good [] Fair  [] Poor    Patient Requires Follow-up: [x] Yes  [] No    PLAN: 1-2x/week for 6 weeks  [x] Continue per plan of care [] Alter current plan (see comments)  [] Plan of care initiated [] Hold pending MD visit []

## 2019-04-30 ENCOUNTER — TREATMENT (OUTPATIENT)
Dept: PHYSICAL THERAPY | Age: 54
End: 2019-04-30
Payer: COMMERCIAL

## 2019-04-30 DIAGNOSIS — M75.111 PARTIAL NONTRAUMATIC TEAR OF ROTATOR CUFF, RIGHT: ICD-10-CM

## 2019-04-30 DIAGNOSIS — M25.511 PAIN IN JOINT OF RIGHT SHOULDER: ICD-10-CM

## 2019-04-30 DIAGNOSIS — M25.611 DECREASED ROM OF RIGHT SHOULDER: ICD-10-CM

## 2019-04-30 DIAGNOSIS — M75.01 ADHESIVE CAPSULITIS OF RIGHT SHOULDER: Primary | ICD-10-CM

## 2019-04-30 PROCEDURE — 97112 NEUROMUSCULAR REEDUCATION: CPT | Performed by: PHYSICAL THERAPIST

## 2019-04-30 PROCEDURE — 97530 THERAPEUTIC ACTIVITIES: CPT | Performed by: PHYSICAL THERAPIST

## 2019-04-30 PROCEDURE — 97140 MANUAL THERAPY 1/> REGIONS: CPT | Performed by: PHYSICAL THERAPIST

## 2019-04-30 PROCEDURE — 97110 THERAPEUTIC EXERCISES: CPT | Performed by: PHYSICAL THERAPIST

## 2019-04-30 NOTE — FLOWSHEET NOTE
[facility]   Phone: [phone]    Fax: [phone]        Physical Therapy Daily Treatment Note  Date:  2019    Patient Name:  Clarence Garcia    :  1965  MRN: [id number]  Medical/Treatment Diagnosis Information:  · Diagnosis: R partial RTC tear, bursitis, and adhesive capsulitis  · DOS:   Insurance/Certification information:  PT Insurance Information: Medical [place]  Physician Information:  Referring Practitioner: Dr. Flonnie Prader of care signed (Y/N): yes 19    Date of Patient follow up with Physician: 19    Date Applied:  19  Functional Assessment Tool Used: UEFI  Score: 34/80, 57.5% limitation  Functional Limitation: Carrying, moving and handling objects      Progress Note: []  Yes  [x]  No  Next due by: 19      Latex Allergy:  [x]NO      []YES  Preferred Language for Healthcare:   [x]English       []other:    Visit # Insurance Allowable   20 35     Pain level:  3-4/10     SUBJECTIVE:  Patient reports that the anterior shoulder/biceps still feels very tight. She notes that overall, her pain has decreased from last session.     >11 week postop    OBJECTIVE: See eval             ROM PROM AROM  Comment:  18     L R L R     Flexion    136° 155° 96°     Abduction     160° 77°     ER   38° T4 Occiput     IR     T3 Center of buttock     Other             Other                    Strength: deferred secondary to surgery L R Comment:  19         Special Tests Results/Comment: deferred secondary to surgery:  19          RESTRICTIONS/PRECAUTIONS: R RTC repair, SAD, MANISH on 19; ROM restrictions: flexion to 140°, ER to 40°    Exercises:  Exercise/Equipment Resistance/Repetitions Comments   Cardio/Warm-up     Bike          Stretching/PROM     Wand 10x10\" ER at 0° abd to 45°   Supine cane ER 10x10\" @ 45° abd   Table Slides 10x10\" Flex to 140°   Wall slides 10x10\"    UE [place] 10x10\" flex   Table walk back 10x10\"    Pulleys 5'    Pendulum  30x F/b, s/s      Wrist 4 way 20x3#    UT stretch 10x10\"    Doorway ER 10x10\" At 0° abd   BB adduction 10x10\"    Biceps stretch 10x10\"         STRENGTH     Isometrics     Retraction 20x5\"    Shrugs 20x5\"     Blue, 3'    Finger extension Rubber band, 3'    Weight shift     Flexion     Abduction     External Rotation     Internal Rotation     Biceps 3#, 2x10    Triceps          PRE's     Flexion     Abduction     External Rotation     Internal Rotation     Shrugs     EXT     Reverse Flys     Serratus     Horizontal Abd with ER     Biceps     Triceps     Retraction          Cable Column/Theraband     External Rotation     Internal Rotation     Shrugs     Lats     Ext     Flex     Scapular Retraction Yellow, 2x10    BIC     TRIC     PNF          Neuromuscular Re-ed     Dynamic Stability                              [place]                    Manual/Modalities        Manual PROM flexion and ER of R shoulder, contract/relax techniques for R shoulder ER/IR ROM 10'                    Therapeutic Exercise and NMR EXR  [x] (61107) Provided verbal/tactile cueing for activities related to strengthening, flexibility, endurance, ROM  for improvements in scapular, scapulothoracic and UE control with self care, reaching, carrying, lifting, house/yardwork, driving/computer work. [x] (11307) Provided verbal/tactile cueing for activities related to improving balance, coordination, kinesthetic sense, posture, motor skill, proprioception  to assist with  scapular, scapulothoracic and UE control with self care, reaching, carrying, lifting, house/yardwork, driving/computer work.     Therapeutic Activities:    [] (24517 or 50181) Provided verbal/tactile cueing for activities related to improving balance, coordination, kinesthetic sense, posture, motor skill, proprioception and motor activation to allow for proper function of scapular, scapulothoracic and UE control with self care, carrying, lifting, driving/computer work.     Home Exercise Program:    [x] (09111) Reviewed/Progressed HEP activities related to strengthening, flexibility, endurance, ROM of scapular, scapulothoracic and UE control with self care, reaching, carrying, lifting, house/yardwork, driving/computer work  [] (14152) Reviewed/Progressed HEP activities related to improving balance, coordination, kinesthetic sense, posture, motor skill, proprioception of scapular, scapulothoracic and UE control with self care, reaching, carrying, lifting, house/yardwork, driving/computer work      Manual Treatments:  PROM / STM / Oscillations-Mobs:  G-I, II, III, IV (PA's, Inf., Post.)  [x] (63672) Provided manual therapy to mobilize soft tissue/joints of cervical/CT, scapular GHJ and UE for the purpose of modulating pain, promoting relaxation,  increasing ROM, reducing/eliminating soft tissue swelling/inflammation/restriction, improving soft tissue extensibility and allowing for proper ROM for normal function with self care, reaching, carrying, lifting, house/yardwork, driving/computer work    Modalities:  CP to R shoulder x10'     Charges:  Timed Code Treatment Minutes: 55   Total Treatment Minutes: 88     [] EVAL (LOW) 63255 (typically 20 minutes face-to-face)  [] EVAL (MOD) 70324 (typically 30 minutes face-to-face)  [] EVAL (HIGH) 65094 (typically 45 minutes face-to-face)  [] RE-EVAL   [x] WP(93806) x  1   [] IONTO    [x] NMR (25606) x  1   [] VASO  [x] Manual (13414) x  1    [] Other:  [x] TA (19774) x  1    [] Mech Traction (61394)  [] ES(attended) (11430)      [] ES (un) (10311):     GOALS:  Patient stated goal: Able to play golf without increased pain in the R shoulder.     Therapist goals for Patient:   Short Term Goals: To be achieved in: 2 weeks  1. Independent in HEP and progression per patient tolerance, in order to prevent re-injury. MET  2.  Patient will have a decrease in pain to facilitate improvement in movement, function, and ADLs as indicated by Functional Deficits. ONGOING     Long Term Goals: To be achieved in: 6 weeks  1. Disability index score of 10% or less for the UEFI to assist with reaching prior level of function. ONGOING  2. Patient will demonstrate increased R shoulder flexion, abduction, ER, and IR AROM to >160°, >160°, T3, and >T8 respectively, to allow for proper joint functioning as indicated by patients Functional Deficits. ONGOING  3. Patient will demonstrate an increase in R shoulder strength in all planes by a half grade or greater to allow for proper functional mobility as indicated by patients Functional Deficits. ONGOING  4. Patient will return to all functional activities without increased symptoms or restriction. ONGOING  5. Patient will be able to complete bathing and dressing activities with R UE without increased pain in the R shoulder so that she may return to PLOF for all ADLs. ONGOING  6. Patient will be able to play 9 holes of golf without increased pain in the R shoulder so that she may return to PLOF. ONGOING     Progression Towards Functional goals:  [x] Patient is progressing as expected towards functional goals listed. [] Progression is slowed due to complexities listed. [] Progression has been slowed due to co-morbidities. [] Plan just implemented, too soon to assess goals progression  [] Other:     ASSESSMENT:  Patient continues to demonstrate moderate to severe limitations in R shoulder ROM in all planes. She responds favorably to contract relax techniques implemented for R shoulder ER and IR PROM stretching today. PT stressed importance of wall slides and pulleys for OH ROM. She tolerated introduction of biceps stretch well, and reports decreased soreness following this stretch.     Treatment/Activity Tolerance:  [x] Patient tolerated treatment well [] Patient limited by fatique  [] Patient limited by pain  [] Patient limited by other medical complications  [] Other:     Prognosis: [x] Good [] Fair  [] Poor    Patient Requires Follow-up: [x] Yes  [] No    PLAN: 1-2x/week for 6 weeks  [x] Continue per plan of care [] Alter current plan (see comments)  [] Plan of care initiated [] Hold pending MD visit [] Discharge    Electronically signed by: Nguyen Gilman PT, DPT, OMT-C      [place] PT license: [id number]  [place] PT license: [id number]

## 2019-05-07 ENCOUNTER — OFFICE VISIT (OUTPATIENT)
Dept: ORTHOPEDIC SURGERY | Age: 54
End: 2019-05-07

## 2019-05-07 VITALS
DIASTOLIC BLOOD PRESSURE: 68 MMHG | SYSTOLIC BLOOD PRESSURE: 101 MMHG | HEIGHT: 66 IN | HEART RATE: 71 BPM | BODY MASS INDEX: 19.45 KG/M2 | WEIGHT: 121.03 LBS

## 2019-05-07 DIAGNOSIS — Z98.890 STATUS POST RIGHT ROTATOR CUFF REPAIR: Primary | ICD-10-CM

## 2019-05-07 DIAGNOSIS — Z98.890 S/P SHOULDER SURGERY: ICD-10-CM

## 2019-05-07 PROCEDURE — 99024 POSTOP FOLLOW-UP VISIT: CPT | Performed by: ORTHOPAEDIC SURGERY

## 2019-05-07 NOTE — PROGRESS NOTES
History of Present Illness:  Rocio Ortega is a pleasant, 46 y.o., female, here today for follow of up of right shoulder. She is 3 months out following a right shoulder diagnostic arthroscopy, debridement, decompression and capsular release and rotator cuff repair with augmentation on 2/11/2018. She has continued physical therapy at [facility] working with Columba Damian. She does report some pain into her deltoid with certain activities. She also reports she is able to do more with pain continuing to diminish. She reports no new injuries or setbacks. Medical History:  Patient's medications, allergies, past medical, surgical, social and family histories were reviewed and updated as appropriate. Review of Systems  A 14 point review of systems was completed by the patient on 6/28/18 and is available in the media section of the scanned medical record and was reviewed on 5/7/2019. The review is negative with the exception of those things mentioned in the HPI and Past Medical History    Vital Signs:  Vitals:    05/07/19 1028   BP: 101/68   Pulse: 71       General/Appearance: Alert and oriented and in no apparent distress. Skin:  There are no skin lesions, cellulitis, or extreme edema. The patient has warm and well-perfused Bilateral upper extremities with brisk capillary refill. RIGHT Shoulder Exam:  Inspection: No gross deformities, no signs of infection. Palpation:  Nontender with light palpation. Active Range of Motion: Forward Elevation 110, Internal Rotation L2 with pain at end range with soft end feel     Passive Range of Motion: Forward Elevation 140, External Rotation 30    Strength: Deferred    Special Tests:  No Isai muscle deformity. Neurovascular: Sensation to light touch is intact, no motor deficits, palpable radial pulses 2+      Radiology:     No new XR obtained at this time.           Assessment :  Ms. Rocio Ortega is a pleasant, 46 y.o. patient who is 3 months out following a right shoulder diagnostic arthroscopy, debridement, decompression and capsular release and rotator cuff repair with augmentation on 2/11/2018. She is progressing well despite the challenges associated with rehabbing a shoulder that underwent both release and repair. Impression:  Encounter Diagnoses   Name Primary?  S/P shoulder surgery     Status post right rotator cuff repair Yes       Office Procedures:  Orders Placed This Encounter   Procedures    Ambulatory referral to Physical Therapy     Referral Priority:   Routine     Referral Type:   Eval and Treat     Referral Reason:   Specialty Services Required     Requested Specialty:   Physical Therapy     Number of Visits Requested:   1       Treatment Plan:  Gabriela Walton is doing well. We assured her that the pain into her deltoid is referred pain from her shoulder. Her motion continues to improve despite trending stiff initially. Continue in physical therapy at [facility] working with Haseeb Ventura. A new physical therapy letter was [facility] in [facility] today. Due to insurance coverage of PT visits, she will continue going once per week. She may begin rotator cuff strengthening, however the priority should be stretching at this point. We will see Alonzo Loza back in 4 weeks and/or as needed. All questions were answered to patient's satisfaction and was encouraged to call with any further questions or concerns. Gabriela Walton is in agreement with this plan. 5/7/2019  10:39 AM      Wicho Hansen ATC  Orthopaedic Sports Medicine     During this examination, I, Wicho Hansen ATC, functioned as a scribe for Dr. Hilary Quintero. The history taking and physical examination were performed by Dr. María Mtz. All counsleing during the appointment was performed between the patient and Dr. María Mtz.  5/7/19   ____________________  IDr. Hilary, personally performed the services described in this documentation as described by

## 2019-05-07 NOTE — LETTER
Physical Therapy Rehabilitation Referral    Patient Name:  Rashid Mello      YOB: 1965    Diagnosis:    S/P shoulder surgery Yes    Status post right rotator cuff repair      Chronic pain of both shoulders      Right rotator cuff tendonitis      DOS:2/11/2019      Precautions:    [x] Evaluate and Treat    Post Op Instructions:  [] Continuous passive motion (CPM)  [x] Elbow ROM  [x] Exercise in plane of scapula  []  Strengthening     [] Pulley and instruction   [x] Home exercise program (copy to patient)   [x] Sling when arm at risk  [] Sling or brace at all times   [x] AAROM: Forward elevation to 140           [x] AAROM: External rotation to 40    [] Isometric external rotator strengthening [x] AAROM: internal rotation: up the back  [x] Isometric abductor strengthening  [x] AAROM: Internal abduction   [] Isometric internal rotator strengthening [x] AAROM: cross-body adduction             Stretching:     Strengthening: Gentle  [x] Four quadrant (FE, ER, IR, CBA)  [x] Rotator cuff (ER, IR, Abd)  [x] Forward Elevation    [] External Rotators     [x] External Rotation    [] Internal Rotators  [x] Internal Rotation: up/back   [] Abductors     [x] Internal Rotation: supine in abduction  [x] Sleeper Stretch    [] Flexors  [x] Cross-body abduction    [] Extensors  [x] Pendulum (FE, Abd/Add, cw/ccw)  [x] Scapular Stabilizers   [x] Wall-walking (FE, Abd)        [x] Shoulder shrugs     [x] Table slides (FE)                [x] Rhomboid pinch  [] Elbow (flex, ext, pron, sup)        [] Lat.  Pull downs     [] Medial epicondylitis program       [] Forward punch   [] Lateral epicondylitis program       [] Internal rotators     [] Progressive resistive exercises  [] Bench Press        [] Bench press plus  Activities:     [] Lateral pull-downs  [] Rowing     [] Progressive two-hand supine press  [] Stepper/Exercise bike   [] Biceps: curls/supination  [] Swimming  [] Water exercises Modalities:     Return to Sport:  [x] Of Choice      [] Plyometrics  [] Ultrasound     [] Rhythmic stabilization  [] Iontophoresis    [] Core strengthening   [] Moist heat     [] Sports specific program:   [] Massage         [x] Cryotherapy      [] Electrical stimulation     [] Paraffin  [] Whirlpool  [] TENS    [x] Home exercise program (copy to patient). Perform exercises for:   15     minutes    3      times/day  [x] Supervised physical therapy  Frequency: []  1x week  [x] 2x week  [] 3x week  [] Other:   Duration: [] 2 weeks   [] 4 weeks  [x] 6 weeks  [] Other:     Additional Instructions: May begin gentle RTC strengthening     Aldo Suarez MD, PhD

## 2019-05-14 ENCOUNTER — TREATMENT (OUTPATIENT)
Dept: PHYSICAL THERAPY | Age: 54
End: 2019-05-14
Payer: COMMERCIAL

## 2019-05-14 DIAGNOSIS — M25.511 PAIN IN JOINT OF RIGHT SHOULDER: ICD-10-CM

## 2019-05-14 DIAGNOSIS — M25.611 DECREASED ROM OF RIGHT SHOULDER: ICD-10-CM

## 2019-05-14 DIAGNOSIS — M75.01 ADHESIVE CAPSULITIS OF RIGHT SHOULDER: Primary | ICD-10-CM

## 2019-05-14 PROCEDURE — 97140 MANUAL THERAPY 1/> REGIONS: CPT | Performed by: PHYSICAL THERAPIST

## 2019-05-14 PROCEDURE — 97112 NEUROMUSCULAR REEDUCATION: CPT | Performed by: PHYSICAL THERAPIST

## 2019-05-14 PROCEDURE — 97110 THERAPEUTIC EXERCISES: CPT | Performed by: PHYSICAL THERAPIST

## 2019-05-14 PROCEDURE — 97530 THERAPEUTIC ACTIVITIES: CPT | Performed by: PHYSICAL THERAPIST

## 2019-05-14 NOTE — FLOWSHEET NOTE
[facility]   Phone: [phone]    Fax: [phone]        Physical Therapy Daily Treatment Note  Date:  2019    Patient Name:  Gabriela Walton    :  1965  MRN: [id number]  Medical/Treatment Diagnosis Information:  · Diagnosis: R partial RTC tear, bursitis, and adhesive capsulitis  · DOS: 63  Insurance/Certification information:  PT Insurance Information: [employer] [place]  Physician Information:  Referring Practitioner: Dr. Rashaad Newsome of care signed (Y/N): yes 19    Date of Patient follow up with Physician: 19    Date Applied:  19  Functional Assessment Tool Used: UEFI  Score: 34/80, 57.5% limitation  Functional Limitation: Carrying, moving and handling objects      Progress Note: []  Yes  [x]  No  Next due by: 19      Latex Allergy:  [x]NO      []YES  Preferred Language for Healthcare:   [x]English       []other:    Visit # Insurance Allowable   21 35     Pain level:  3-4/10     SUBJECTIVE:  Patient states that overall her pain feels as if it hasn't changed since last visit, however she says she feels more \"flexible\" and has been able to more.       >13 week postop    OBJECTIVE: See eval             ROM PROM AROM  Comment:  18     L R L R     Flexion    136° 155° 96°     Abduction     160° 77°     ER   38° T4 Occiput     IR     T3 Center of buttock     Other             Other                    Strength: deferred secondary to surgery L R Comment:  19         Special Tests Results/Comment: deferred secondary to surgery:  19          RESTRICTIONS/PRECAUTIONS: R RTC repair, SAD, MANISH on 19; ROM restrictions: flexion to 140°, ER to 40°    Exercises:  Exercise/Equipment Resistance/Repetitions Comments   Cardio/Warm-up     Bike          Stretching/PROM     Wand 10x10\" ER at 0° abd to 45°   Supine cane ER 10x10\" @ 45° abd   Supine cane flexion 10x10\"    Table Slides 10x10\" Flex to 140°   Wall slides 10x10\"    UE [place] 10x10\" flex   Table walk back 10x10\"    Pulleys 5'    Pendulum  30x F/b, s/s      Wrist 4 way 20x3#    UT stretch 10x10\"    Doorway ER 10x10\" At 0° abd   BB adduction 10x10\"    BBIR 10x10\"    Biceps stretch 10x10\"         STRENGTH     Isometrics     Retraction 20x5\"    Shrugs 20x5\"     Blue, 3'    Finger extension Rubber band, 3'    Weight shift     Flexion     Abduction     External Rotation     Internal Rotation     Biceps 3#, 2x10    Triceps          PRE's     Flexion     Abduction     External Rotation     Internal Rotation     Shrugs     EXT     Reverse Flys     Serratus     Horizontal Abd with ER     Biceps     Triceps     Retraction          Cable Column/Theraband     External Rotation     Internal Rotation     Shrugs     Lats     Ext     Flex     Scapular Retraction Yellow, 2x10    BIC     TRIC     PNF          Neuromuscular Re-ed     Dynamic Stability                              [place]ack                    Manual/Modalities        Manual PROM flexion and ER of R shoulder,  10'                    Therapeutic Exercise and NMR EXR  [x] (40498) Provided verbal/tactile cueing for activities related to strengthening, flexibility, endurance, ROM  for improvements in scapular, scapulothoracic and UE control with self care, reaching, carrying, lifting, house/yardwork, driving/computer work. [x] (66738) Provided verbal/tactile cueing for activities related to improving balance, coordination, kinesthetic sense, posture, motor skill, proprioception  to assist with  scapular, scapulothoracic and UE control with self care, reaching, carrying, lifting, house/yardwork, driving/computer work.     Therapeutic Activities:    [] (87362 or 95563) Provided verbal/tactile cueing for activities related to improving balance, coordination, kinesthetic sense, posture, motor skill, proprioception and motor activation to allow for proper function of scapular, scapulothoracic and UE control with self care, carrying, lifting, driving/computer work. Home Exercise Program:    [x] (26890) Reviewed/Progressed HEP activities related to strengthening, flexibility, endurance, ROM of scapular, scapulothoracic and UE control with self care, reaching, carrying, lifting, house/yardwork, driving/computer work  [] (95395) Reviewed/Progressed HEP activities related to improving balance, coordination, kinesthetic sense, posture, motor skill, proprioception of scapular, scapulothoracic and UE control with self care, reaching, carrying, lifting, house/yardwork, driving/computer work      Manual Treatments:  PROM / STM / Oscillations-Mobs:  G-I, II, III, IV (PA's, Inf., Post.)  [x] (25745) Provided manual therapy to mobilize soft tissue/joints of cervical/CT, scapular GHJ and UE for the purpose of modulating pain, promoting relaxation,  increasing ROM, reducing/eliminating soft tissue swelling/inflammation/restriction, improving soft tissue extensibility and allowing for proper ROM for normal function with self care, reaching, carrying, lifting, house/yardwork, driving/computer work    Modalities:  CP to R shoulder x10'     Charges:  Timed Code Treatment Minutes: 55   Total Treatment Minutes: 89     [] EVAL (LOW) 20816 (typically 20 minutes face-to-face)  [] EVAL (MOD) 42146 (typically 30 minutes face-to-face)  [] EVAL (HIGH) 11578 (typically 45 minutes face-to-face)  [] RE-EVAL   [x] ST(74209) x  1   [] IONTO    [x] NMR (01039) x  1   [] VASO  [x] Manual (02718) x  1    [] Other:  [x] TA (79607) x  1    [] [facility] (09790)  [] ES(attended) (78698)      [] ES (un) (20093):     GOALS:  Patient stated goal: Able to play golf without increased pain in the R shoulder.     Therapist goals for Patient:   Short Term Goals: To be achieved in: 2 weeks  1. Independent in HEP and progression per patient tolerance, in order to prevent re-injury. MET  2.  Patient will have a decrease in pain to facilitate improvement in movement, function, and ADLs as indicated by Functional Deficits. ONGOING     Long Term Goals: To be achieved in: 6 weeks  1. Disability index score of 10% or less for the UEFI to assist with reaching prior level of function. ONGOING  2. Patient will demonstrate increased R shoulder flexion, abduction, ER, and IR AROM to >160°, >160°, T3, and >T8 respectively, to allow for proper joint functioning as indicated by patients Functional Deficits. ONGOING  3. Patient will demonstrate an increase in R shoulder strength in all planes by a half grade or greater to allow for proper functional mobility as indicated by patients Functional Deficits. ONGOING  4. Patient will return to all functional activities without increased symptoms or restriction. ONGOING  5. Patient will be able to complete bathing and dressing activities with R UE without increased pain in the R shoulder so that she may return to PLOF for all ADLs. ONGOING  6. Patient will be able to play 9 holes of golf without increased pain in the R shoulder so that she may return to PLOF. ONGOING     Progression Towards Functional goals:  [x] Patient is progressing as expected towards functional goals listed. [] Progression is slowed due to complexities listed. [] Progression has been slowed due to co-morbidities. [] Plan just implemented, too soon to assess goals progression  [] Other:     ASSESSMENT:  Patient demonstrates improving R shoulder flexion ROM noted during wall slide and pulley activities today. She continues to require frequent reassurance that she is progressing and pain that she experiences is normal for the healing process. She demonstrates significant fatigue and reports increase soreness with introduction of BBIR stretch today. Patient reports that following today's program and manual therapy, pain is decreased.      Treatment/Activity Tolerance:  [x] Patient tolerated treatment well [] Patient limited by fatique  [] Patient limited by pain  [] Patient limited by other medical complications  [] Other:     Prognosis: [x] Good [] Fair  [] Poor    Patient Requires Follow-up: [x] Yes  [] No    PLAN: 1-2x/week for 6 weeks  [x] Continue per plan of care [] Alter current plan (see comments)  [] Plan of care initiated [] Hold pending MD visit [] Discharge    Electronically signed by: Shandra Mo DPT, CHERRYC      [place] PT license: [id number]  [place] PT license: [id number]

## 2019-05-21 ENCOUNTER — TREATMENT (OUTPATIENT)
Dept: PHYSICAL THERAPY | Age: 54
End: 2019-05-21
Payer: COMMERCIAL

## 2019-05-21 DIAGNOSIS — M75.111 PARTIAL NONTRAUMATIC TEAR OF ROTATOR CUFF, RIGHT: ICD-10-CM

## 2019-05-21 DIAGNOSIS — M75.01 ADHESIVE CAPSULITIS OF RIGHT SHOULDER: Primary | ICD-10-CM

## 2019-05-21 DIAGNOSIS — M25.511 PAIN IN JOINT OF RIGHT SHOULDER: ICD-10-CM

## 2019-05-21 DIAGNOSIS — M25.611 DECREASED ROM OF RIGHT SHOULDER: ICD-10-CM

## 2019-05-21 PROCEDURE — 97530 THERAPEUTIC ACTIVITIES: CPT | Performed by: PHYSICAL THERAPIST

## 2019-05-21 PROCEDURE — 97112 NEUROMUSCULAR REEDUCATION: CPT | Performed by: PHYSICAL THERAPIST

## 2019-05-21 PROCEDURE — 97140 MANUAL THERAPY 1/> REGIONS: CPT | Performed by: PHYSICAL THERAPIST

## 2019-05-21 PROCEDURE — 97110 THERAPEUTIC EXERCISES: CPT | Performed by: PHYSICAL THERAPIST

## 2019-05-21 NOTE — FLOWSHEET NOTE
No  Next due by: 6/21/19      Latex Allergy:  [x]NO      []YES  Preferred Language for Healthcare:   [x]English       []other:    Visit # Insurance Allowable   22 35     Pain level:  6-7/10     SUBJECTIVE:  Patient notes that she feels the worst that she has since surgery. She is very concerned in the level of pain she is having this far out from surgery. She notes that she only has been taking one 200mg ibuprofen occasionally for the pain, and she acknowledges that she may need to take a higher dose or more frequently to help control pain and inflammation.     >14 week postop    OBJECTIVE: See eval             ROM PROM AROM  Comment:  5/21/18     L R L R     Flexion    144° 155° 103°     Abduction     160° 81°     ER   65° T4 C6     IR     T3 L5     Other             Other                    Strength: deferred secondary to surgery L R Comment:  5/21/19         Special Tests Results/Comment: deferred secondary to surgery:  5/21/19          RESTRICTIONS/PRECAUTIONS: R RTC repair, SAD, MANISH on 2/11/19; ROM restrictions: flexion to 140°, ER to 40°    Exercises:  Exercise/Equipment Resistance/Repetitions Comments   Cardio/Warm-up     Bike          Stretching/PROM     Wand 10x10\" ER at 0° abd to 45°   Supine cane ER 10x10\" @ 45° abd   Supine cane flexion 10x10\"    Table Slides 10x10\" Flex to 140°   Wall slides 10x10\"    UE [place] 10x10\" flex   Table walk back 10x10\"    Pulleys 5'    Pendulum  30x F/b, s/s      Wrist 4 way 20x3#    UT stretch 10x10\"    Doorway ER 10x10\" At 0° abd   BB adduction 10x10\"    BBIR 10x10\"    Biceps stretch 10x10\"         STRENGTH     Isometrics     Retraction 20x5\"    Shrugs 20x5\"     Blue, 3'    Finger extension Rubber band, 3'    Weight shift     Flexion     Abduction     External Rotation     Internal Rotation     Biceps 3#, 2x10    Triceps          PRE's     Flexion     Abduction     External Rotation     Internal Rotation     Shrugs     EXT     Reverse Flys     Serratus     Horizontal Abd with ER     Biceps     Triceps     Retraction          Cable Column/Theraband     External Rotation     Internal Rotation     Shrugs     Lats     Ext     Flex     Scapular Retraction Yellow, 2x10    BIC     TRIC     PNF          Neuromuscular Re-ed     Dynamic Stability                              [place]                    Manual/Modalities        Manual PROM flexion and ER of R shoulder, contract/relax techniques for R shoulder ER/IR ROM 10'                    Therapeutic Exercise and NMR EXR  [x] (92172) Provided verbal/tactile cueing for activities related to strengthening, flexibility, endurance, ROM  for improvements in scapular, scapulothoracic and UE control with self care, reaching, carrying, lifting, house/yardwork, driving/computer work. [x] (77294) Provided verbal/tactile cueing for activities related to improving balance, coordination, kinesthetic sense, posture, motor skill, proprioception  to assist with  scapular, scapulothoracic and UE control with self care, reaching, carrying, lifting, house/yardwork, driving/computer work. Therapeutic Activities:    [] (67298 or 25982) Provided verbal/tactile cueing for activities related to improving balance, coordination, kinesthetic sense, posture, motor skill, proprioception and motor activation to allow for proper function of scapular, scapulothoracic and UE control with self care, carrying, lifting, driving/computer work.      Home Exercise Program:    [x] (99917) Reviewed/Progressed HEP activities related to strengthening, flexibility, endurance, ROM of scapular, scapulothoracic and UE control with self care, reaching, carrying, lifting, house/yardwork, driving/computer work  [] (73005) Reviewed/Progressed HEP activities related to improving balance, coordination, kinesthetic sense, posture, motor skill, proprioception of scapular, scapulothoracic and UE control with self care, reaching, carrying, lifting, house/yardwork, driving/computer work Manual Treatments:  PROM / STM / Oscillations-Mobs:  G-I, II, III, IV (PA's, Inf., Post.)  [x] (99031) Provided manual therapy to mobilize soft tissue/joints of cervical/CT, scapular GHJ and UE for the purpose of modulating pain, promoting relaxation,  increasing ROM, reducing/eliminating soft tissue swelling/inflammation/restriction, improving soft tissue extensibility and allowing for proper ROM for normal function with self care, reaching, carrying, lifting, house/yardwork, driving/computer work    Modalities:  CP to R shoulder x10'     Charges:  Timed Code Treatment Minutes: 55   Total Treatment Minutes: 95     [] EVAL (LOW) 90633 (typically 20 minutes face-to-face)  [] EVAL (MOD) 22748 (typically 30 minutes face-to-face)  [] EVAL (HIGH) 86293 (typically 45 minutes face-to-face)  [] RE-EVAL   [x] JN(52941) x  1   [] IONTO    [x] NMR (38196) x  1   [] VASO  [x] Manual (77829) x  1    [] Other:  [x] TA (09351) x  1    [] Mech Traction (74586)  [] ES(attended) (63615)      [] ES (un) (10325):     GOALS:  Patient stated goal: Able to play golf without increased pain in the R shoulder.     Therapist goals for Patient:   Short Term Goals: To be achieved in: 2 weeks  1. Independent in HEP and progression per patient tolerance, in order to prevent re-injury. MET  2. Patient will have a decrease in pain to facilitate improvement in movement, function, and ADLs as indicated by Functional Deficits. ONGOING     Long Term Goals: To be achieved in: 6 weeks  1. Disability index score of 10% or less for the UEFI to assist with reaching prior level of function. ONGOING  2. Patient will demonstrate increased R shoulder flexion, abduction, ER, and IR AROM to >160°, >160°, T3, and >T8 respectively, to allow for proper joint functioning as indicated by patients Functional Deficits. ONGOING  3.  Patient will demonstrate an increase in R shoulder strength in all planes by a half grade or greater to allow for proper functional mobility as indicated by patients Functional Deficits. ONGOING  4. Patient will return to all functional activities without increased symptoms or restriction. ONGOING  5. Patient will be able to complete bathing and dressing activities with R UE without increased pain in the R shoulder so that she may return to PLOF for all ADLs. ONGOING  6. Patient will be able to play 9 holes of golf without increased pain in the R shoulder so that she may return to PLOF. ONGOING     Progression Towards Functional goals:  [x] Patient is progressing as expected towards functional goals listed. [] Progression is slowed due to complexities listed. [] Progression has been slowed due to co-morbidities. [] Plan just implemented, too soon to assess goals progression  [] Other:     ASSESSMENT:  Patient continues to demonstrate good progress in R shoulder active and passive ROM. She requires continuous reassurance from PT that she is getting better, and that her symptoms are normal. She was again educated on the fact that her body has the natural tendency to tighten and lay down scar tissue which limits motion. She was instructed in use of bike or elliptical at home with gentle arm motion to decrease stiffness. PT provided patient with modified list of exercises for HEP to be performed 5-6 days a week, assuring she take rest days. Patient would benefit from continued skilled PT services to address remaining deficits in R shoulder ROM, strength, and function so that she may return to PLOF for all ADLs.     Treatment/Activity Tolerance:  [x] Patient tolerated treatment well [] Patient limited by fatique  [] Patient limited by pain  [] Patient limited by other medical complications  [] Other:     Prognosis: [x] Good [] Fair  [] Poor    Patient Requires Follow-up: [x] Yes  [] No    PLAN: 1-2x/week for 6 weeks  [x] Continue per plan of care [] Alter current plan (see comments)  [] Plan of care initiated [] Hold pending MD visit [] Discharge    Electronically signed by: Linda Mejia PT, DPT, OMT-C      [place] PT license: [id number]  [place] PT license: [id number]

## 2019-05-28 ENCOUNTER — TREATMENT (OUTPATIENT)
Dept: PHYSICAL THERAPY | Age: 54
End: 2019-05-28

## 2019-05-28 ENCOUNTER — TELEPHONE (OUTPATIENT)
Dept: ORTHOPEDIC SURGERY | Age: 54
End: 2019-05-28

## 2019-05-28 DIAGNOSIS — M75.111 PARTIAL NONTRAUMATIC TEAR OF ROTATOR CUFF, RIGHT: ICD-10-CM

## 2019-05-28 DIAGNOSIS — M25.511 PAIN IN JOINT OF RIGHT SHOULDER: ICD-10-CM

## 2019-05-28 DIAGNOSIS — M25.611 DECREASED ROM OF RIGHT SHOULDER: ICD-10-CM

## 2019-05-28 DIAGNOSIS — M75.01 ADHESIVE CAPSULITIS OF RIGHT SHOULDER: Primary | ICD-10-CM

## 2019-05-28 NOTE — TELEPHONE ENCOUNTER
Pt called in stating her pt would like her to be seen sooner than her scheduled appt or maybe a steroid called in. Please call pt at [phone].

## 2019-05-28 NOTE — FLOWSHEET NOTE
[facility]   Phone: [phone]    Fax: [phone]            Physical Therapy  Cancellation/No-show Note  Patient Name:  Ernie Kearney  :  1965   Date:  2019  Cancelled visits to date: 1  No-shows to date: 0    For today's appointment patient:  [x]  Cancelled  []  Rescheduled appointment  []  No-show     Reason given by patient:  []  Patient ill  []  Conflicting appointment  []  No transportation    []  Conflict with work  []  No reason given  [x]  Other:     Comments:  Patient is having new, sharp pain in the top of her R shoulder when trying to reach up with the R arm, and was instructed to contact MD's office to see if they wanted to move up her follow up appointment with Dr. Juan Carlos Evangelista.     Electronically signed by:  Sandra Mcmanus, PT, DPT, OMT-C      [address] [address] PT license: [id number]  [place] PT license: [id number]

## 2019-05-30 ENCOUNTER — OFFICE VISIT (OUTPATIENT)
Dept: ORTHOPEDIC SURGERY | Age: 54
End: 2019-05-30
Payer: COMMERCIAL

## 2019-05-30 VITALS
BODY MASS INDEX: 19.45 KG/M2 | WEIGHT: 121.03 LBS | DIASTOLIC BLOOD PRESSURE: 71 MMHG | SYSTOLIC BLOOD PRESSURE: 108 MMHG | HEIGHT: 66 IN | HEART RATE: 78 BPM

## 2019-05-30 DIAGNOSIS — Z98.890 STATUS POST RIGHT ROTATOR CUFF REPAIR: ICD-10-CM

## 2019-05-30 DIAGNOSIS — Z98.890 S/P SHOULDER SURGERY: Primary | ICD-10-CM

## 2019-05-30 DIAGNOSIS — M25.511 CHRONIC PAIN OF BOTH SHOULDERS: ICD-10-CM

## 2019-05-30 DIAGNOSIS — M25.512 CHRONIC PAIN OF BOTH SHOULDERS: ICD-10-CM

## 2019-05-30 DIAGNOSIS — G89.29 CHRONIC PAIN OF BOTH SHOULDERS: ICD-10-CM

## 2019-05-30 PROCEDURE — 99213 OFFICE O/P EST LOW 20 MIN: CPT | Performed by: ORTHOPAEDIC SURGERY

## 2019-05-30 NOTE — PROGRESS NOTES
History of Present Illness:  Aubrey Rodriguez is a pleasant, 46 y.o., female, here today for repeat follow of up of right shoulder. She is 4 months out following a right shoulder diagnostic arthroscopy, debridement, decompression and capsular release and rotator cuff repair with augmentation on 2/11/2018. She has continued physical therapy at [facility] working with Lonney Cousin. She reports that she still feels stiff and is also having a new pain on the top of her shoulder. She reports that the area on the top of her shoulder is the main source of her pain. No new injuries or setbacks. She rates her pain today 8 out of 10. She reports she is going on a cruise tomorrow and is hoping we can do something to limit her pain. Medical History:  Patient's medications, allergies, past medical, surgical, social and family histories were reviewed and updated as appropriate. Review of Systems  A 14 point review of systems was completed by the patient on 6/28/18 and is available in the media section of the scanned medical record and was reviewed on 5/30/2019. The review is negative with the exception of those things mentioned in the HPI and Past Medical History    Vital Signs:  Vitals:    05/30/19 0845   BP: 108/71   Pulse: 78       General/Appearance: Alert and oriented and in no apparent distress. Skin:  There are no skin lesions, cellulitis, or extreme edema. The patient has warm and well-perfused Bilateral upper extremities with brisk capillary refill. RIGHT Shoulder Exam:  Inspection: No gross deformities, no signs of infection. Palpation:  She is very tender to palpation over the a.c. Joint. Not as significant of pain over her rotator cuff footprint or biceps tendon    Active Range of Motion: Forward Elevation 120, external rotation to 20°, Internal Rotation L2 versus T4    Passive Range of Motion: Forward Elevation 140, External Rotation 30.   External rotation and abduction is 50° passive internal rotation is 10° passive, crossarm adduction is limited    Strength: Strength is limited by pain but overall is 4/5    Special Tests:  No Isai muscle deformity. Neurovascular: Sensation to light touch is intact, no motor deficits, palpable radial pulses 2+      Radiology:     No new XR obtained at this time. Assessment :  Ms. Jony Luis is a pleasant, 46 y.o. patient who is 4 months out following a right shoulder diagnostic arthroscopy, debridement, decompression and capsular release and rotator cuff repair with augmentation on 2/11/2018. She is dealing with the challenges associated with rehabbing a shoulder that underwent both release and repair and additionally we feel that she has developed some AC joint irritation. Impression:  Encounter Diagnoses   Name Primary?  S/P shoulder surgery Yes    Status post right rotator cuff repair     Chronic pain of both shoulders        Office Procedures:  No orders of the defined types were placed in this encounter. Treatment Plan:  Jony Luis is still dealing with some stiffness and pain in her shoulder for months after her surgery. She needs to continue with her stretches even when she is away on her cruise. We offered her a steroid injection today however she is concerned that she'll experience a flare just as she travels on her three-day cruise. So we started her on a prednisone dose taper for which she already had some pre-existing medication. Very specific instructions for this were given to her. She is back from her cruise would like to see her back in clinic in 7 days. If she is still having significant issues and we would consider an injection of her a.c. joint and glenohumeral joint. We will see Sandro Late back in 4 weeks and/or as needed. All questions were answered to patient's satisfaction and was encouraged to call with any further questions or concerns.  Jony Luis is in agreement with this plan.        Layla García D.O. Clinical Fellow, [address]  Date:    5/30/2019      The encounter with Candy Moe  was supervised by Dr. Jhoan Khan, who personally examined the patient and reviewed the plan. This dictation was performed with a verbal recognition program (DRAGON) and it was checked for errors. It is possible that there are still dictated errors within this office note. If so, please bring any errors to my attention for an addendum. All efforts were made to ensure that this office note is accurate.  _______________  I was physically present and personally supervised the Orthopaedic Sports Medicine Fellow in the evaluation and development of a treatment plan for this patient. I personally interviewed the patient and performed a physical examination. In addition, I discussed the patient's condition and treatment options with them. I have also reviewed and agree with the past medical, family and social history unless otherwise noted. All of the patient's questions were answered. Aldo Khan MD, PhD  5/30/2019

## 2019-06-04 ENCOUNTER — TREATMENT (OUTPATIENT)
Dept: PHYSICAL THERAPY | Age: 54
End: 2019-06-04
Payer: COMMERCIAL

## 2019-06-04 DIAGNOSIS — M75.111 PARTIAL NONTRAUMATIC TEAR OF ROTATOR CUFF, RIGHT: ICD-10-CM

## 2019-06-04 DIAGNOSIS — M25.611 DECREASED ROM OF RIGHT SHOULDER: ICD-10-CM

## 2019-06-04 DIAGNOSIS — M25.511 PAIN IN JOINT OF RIGHT SHOULDER: ICD-10-CM

## 2019-06-04 DIAGNOSIS — M75.01 ADHESIVE CAPSULITIS OF RIGHT SHOULDER: Primary | ICD-10-CM

## 2019-06-04 PROCEDURE — 97110 THERAPEUTIC EXERCISES: CPT | Performed by: PHYSICAL THERAPIST

## 2019-06-04 PROCEDURE — 97140 MANUAL THERAPY 1/> REGIONS: CPT | Performed by: PHYSICAL THERAPIST

## 2019-06-04 PROCEDURE — 97112 NEUROMUSCULAR REEDUCATION: CPT | Performed by: PHYSICAL THERAPIST

## 2019-06-04 PROCEDURE — 97530 THERAPEUTIC ACTIVITIES: CPT | Performed by: PHYSICAL THERAPIST

## 2019-06-04 NOTE — FLOWSHEET NOTE
[facility]   Phone: [phone]    Fax: [phone]        Physical Therapy Daily Treatment Note  Date:  2019    Patient Name:  Aubrey Rodriguez    :  1965  MRN: [id number]  Medical/Treatment Diagnosis Information:  · Diagnosis: R partial RTC tear, bursitis, and adhesive capsulitis  · DOS:   Insurance/Certification information:  PT Insurance Information: Medical [place]  Physician Information:  Referring Practitioner: Dr. Christopher Rangel of care signed (Y/N): yes 19    Date of Patient follow up with Physician: 19    Date Applied:  19  Functional Assessment Tool Used: UEFI  Score: 34/80, 57.5% limitation  Functional Limitation: Carrying, moving and handling objects      Progress Note: []  Yes  [x]  No  Next due by: 19      Latex Allergy:  [x]NO      []YES  Preferred Language for Healthcare:   [x]English       []other:    Visit # Insurance Allowable   23 35     Pain level:  4/10     SUBJECTIVE:  Patient reports she went on a cruise for 3 nights and attempted to do as many home exercises as she could. She states she had some limited space in her room, but tried to make it work. She says she iced her R shoulder a few times when she was at the pool. She feels like she as improved her ability to reach behind back with her R arm, but still has difficulty reaching above her head.       >14 week postop    OBJECTIVE: See eval             ROM PROM AROM  Comment:  18     L R L R     Flexion    144° 155° 103°     Abduction     160° 81°     ER   65° T4 C6     IR     T3 L5     Other             Other                    Strength: deferred secondary to surgery L R Comment:  19         Special Tests Results/Comment: deferred secondary to surgery:  19          RESTRICTIONS/PRECAUTIONS: R RTC repair, SAD, MANISH on 19; ROM restrictions: flexion to 140°, ER to 40°    Exercises:  Exercise/Equipment Resistance/Repetitions Comments kinesthetic sense, posture, motor skill, proprioception and motor activation to allow for proper function of scapular, scapulothoracic and UE control with self care, carrying, lifting, driving/computer work. Home Exercise Program:    [x] (21343) Reviewed/Progressed HEP activities related to strengthening, flexibility, endurance, ROM of scapular, scapulothoracic and UE control with self care, reaching, carrying, lifting, house/yardwork, driving/computer work  [] (10659) Reviewed/Progressed HEP activities related to improving balance, coordination, kinesthetic sense, posture, motor skill, proprioception of scapular, scapulothoracic and UE control with self care, reaching, carrying, lifting, house/yardwork, driving/computer work      Manual Treatments:  PROM / STM / Oscillations-Mobs:  G-I, II, III, IV (PA's, Inf., Post.)  [x] (57645) Provided manual therapy to mobilize soft tissue/joints of cervical/CT, scapular GHJ and UE for the purpose of modulating pain, promoting relaxation,  increasing ROM, reducing/eliminating soft tissue swelling/inflammation/restriction, improving soft tissue extensibility and allowing for proper ROM for normal function with self care, reaching, carrying, lifting, house/yardwork, driving/computer work    Modalities:  CP to R shoulder x10'     Charges:  Timed Code Treatment Minutes: 55   Total Treatment Minutes: 79     [] EVAL (LOW) 36763 (typically 20 minutes face-to-face)  [] EVAL (MOD) 61562 (typically 30 minutes face-to-face)  [] EVAL (HIGH) 81866 (typically 45 minutes face-to-face)  [] RE-EVAL   [x] OP(99345) x  1   [] IONTO    [x] NMR (84544) x  1   [] VASO  [x] Manual (96027) x  1    [] Other:  [x] TA (26954) x  1    [] [facility] (90884)  [] ES(attended) (17466)      [] ES (un) (34904):     GOALS:  Patient stated goal: Able to play golf without increased pain in the R shoulder.     Therapist goals for Patient:   Short Term Goals: To be achieved in: 2 weeks  1.  Independent in HEP tolerate stretching at end range in all planes with no discomfort. She was educated/updated on HEP and all questions were answered.       Treatment/Activity Tolerance:  [x] Patient tolerated treatment well [] Patient limited by fatique  [] Patient limited by pain  [] Patient limited by other medical complications  [] Other:     Prognosis: [x] Good [] Fair  [] Poor    Patient Requires Follow-up: [x] Yes  [] No    PLAN: 1-2x/week for 6 weeks  [x] Continue per plan of care [] Alter current plan (see comments)  [] Plan of care initiated [] Hold pending MD visit [] Discharge    Electronically signed by: HONORIO RemyT, OMT-C      [address] [address] PT license: [id number]  [place] PT license: [id number]

## 2019-06-06 ENCOUNTER — OFFICE VISIT (OUTPATIENT)
Dept: ORTHOPEDIC SURGERY | Age: 54
End: 2019-06-06
Payer: COMMERCIAL

## 2019-06-06 VITALS
BODY MASS INDEX: 19.45 KG/M2 | HEIGHT: 66 IN | DIASTOLIC BLOOD PRESSURE: 70 MMHG | SYSTOLIC BLOOD PRESSURE: 105 MMHG | HEART RATE: 68 BPM | WEIGHT: 121.03 LBS

## 2019-06-06 DIAGNOSIS — M75.81 RIGHT ROTATOR CUFF TENDONITIS: ICD-10-CM

## 2019-06-06 DIAGNOSIS — M25.512 CHRONIC PAIN OF BOTH SHOULDERS: ICD-10-CM

## 2019-06-06 DIAGNOSIS — M19.011 ARTHRITIS OF RIGHT ACROMIOCLAVICULAR JOINT: ICD-10-CM

## 2019-06-06 DIAGNOSIS — Z98.890 STATUS POST RIGHT ROTATOR CUFF REPAIR: ICD-10-CM

## 2019-06-06 DIAGNOSIS — Z98.890 S/P SHOULDER SURGERY: Primary | ICD-10-CM

## 2019-06-06 DIAGNOSIS — G89.29 CHRONIC PAIN OF BOTH SHOULDERS: ICD-10-CM

## 2019-06-06 DIAGNOSIS — M25.511 CHRONIC PAIN OF BOTH SHOULDERS: ICD-10-CM

## 2019-06-06 PROCEDURE — 20610 DRAIN/INJ JOINT/BURSA W/O US: CPT | Performed by: ORTHOPAEDIC SURGERY

## 2019-06-06 PROCEDURE — 99213 OFFICE O/P EST LOW 20 MIN: CPT | Performed by: ORTHOPAEDIC SURGERY

## 2019-06-06 NOTE — PROGRESS NOTES
History of Present Illness:  Rocio Ortega is a pleasant, 46 y.o., female, here today for follow up of right shoulder. She underwent right shoulder diagnostic arthroscopy, debridement, decompression and capsular release and rotator cuff repair with augmentation on 2/11/2019. She has continued physical [facility] at [facility]. She developed post surgical stiffness initially, however her motion is improving. She does report some pain over her AC joint but she denies any new injuries or setbacks. Medical History:  Patient's medications, allergies, past medical, surgical, social and family histories were reviewed and updated as appropriate. Review of Systems  A 14 point review of systems was completed by the patient on 6/28/19 and is available in the media section of the scanned medical record and was reviewed on 6/6/2019. The review is negative with the exception of those things mentioned in the HPI and Past Medical History    Vital Signs:  Vitals:    06/06/19 0917   BP: 105/70   Pulse: 68       General/Appearance: Alert and oriented and in no apparent distress. Skin:  There are no skin lesions, cellulitis, or extreme edema. The patient has warm and well-perfused Bilateral upper extremities with brisk capillary refill. RIGHT Shoulder Exam:  Inspection:  No gross deformities, no signs of infection. Palpation:  Tenderness over AC joint. Active Range of Motion: Forward Elevation 100    Passive Range of Motion: Forward Elevation in  abduction 150, External rotation in abduction 50    Strength: Deferred    Special Tests: No Isai muscle deformity. Neurovascular: Sensation to light touch is intact, no motor deficits, palpable radial pulses 2+        Radiology:     No new XR obtained at this time.           Assessment :  Ms. Rocio Ortega is a pleasant, 46 y.o. patient who underwent a right shoulder diagnostic arthroscopy, debridement, decompression and capsular release and rotator cuff repair with augmentation on 2/11/2018. Additionally she has pain related to [facility] joint arthritis. Impression:  Encounter Diagnoses   Name Primary?  S/P shoulder surgery Yes    Status post right rotator cuff repair     Chronic pain of both shoulders     Right rotator cuff tendonitis     Arthritis of right acromioclavicular joint        Office Procedures:  Orders Placed This Encounter   Procedures    Ambulatory referral to Physical Therapy     Referral Priority:   Routine     Referral Type:   Eval and Treat     Referral Reason:   Specialty Services Required     Requested Specialty:   Physical Therapy     Number of Visits Requested:   1    GA METHYLPREDNISOLONE 40 MG INJ    GA ARTHROCENTESIS ASPIR&/INJ MAJOR JT/BURSA W/O US       Treatment Plan:  Jared John has developed pain in her [facility] joint related to arthritis. It is appropriate to proceed with a corticosteroid injection at this time which will be in the [facility] joint and Glenohumeral joint. She should continue in physical therapy at [place] [address]. A new physical therapy letter was documented in EPIC today. We will see Nneka Knowles back in 4 weeks and/or as needed. All questions were answered to patient's satisfaction and was encouraged to call with any further questions or concerns. Jared John is in agreement with this plan. After discussing the risks and benefits of a corticosteroid injection, Nneka Knowles did state an understanding and gave verbal consent to proceed. After cleansing the injection site with Chlora-prep and using aseptic techniques,  2 CC of Depo Medrol 40mg/ml and 8 CC of 1% lidocaine were injected in the right AC joint and Glenohumeral joint. She  tolerated the procedure well with no immediate adverse sequelae after the injection. A bandage was placed over the injection site. Appropriate post injections instructions were given to the patient.         6/6/2019  11:17 AM      Cathryn Buckner

## 2019-06-06 NOTE — PROGRESS NOTES
Patient has had no medical changes since last evaluated    Depomedrol     NDC#: 8339-6986-59  Lot: [id number]  Expiration Date: 1/2021  Dose: 2cc  Location: injection was given in Right  shoulder      Lido    NDC#: 6708-9927-92  Lot: [id number]  Expiration Date: 8/20  Dose:8cc  Location: injection was given in Right shoulder

## 2019-06-12 ENCOUNTER — TREATMENT (OUTPATIENT)
Dept: PHYSICAL THERAPY | Age: 54
End: 2019-06-12
Payer: COMMERCIAL

## 2019-06-12 DIAGNOSIS — M25.611 DECREASED ROM OF RIGHT SHOULDER: ICD-10-CM

## 2019-06-12 DIAGNOSIS — M75.01 ADHESIVE CAPSULITIS OF RIGHT SHOULDER: Primary | ICD-10-CM

## 2019-06-12 DIAGNOSIS — M75.111 PARTIAL NONTRAUMATIC TEAR OF ROTATOR CUFF, RIGHT: ICD-10-CM

## 2019-06-12 DIAGNOSIS — M25.511 PAIN IN JOINT OF RIGHT SHOULDER: ICD-10-CM

## 2019-06-12 PROCEDURE — 97110 THERAPEUTIC EXERCISES: CPT | Performed by: PHYSICAL THERAPIST

## 2019-06-12 PROCEDURE — 97140 MANUAL THERAPY 1/> REGIONS: CPT | Performed by: PHYSICAL THERAPIST

## 2019-06-12 PROCEDURE — 97530 THERAPEUTIC ACTIVITIES: CPT | Performed by: PHYSICAL THERAPIST

## 2019-06-12 PROCEDURE — 97112 NEUROMUSCULAR REEDUCATION: CPT | Performed by: PHYSICAL THERAPIST

## 2019-06-12 NOTE — FLOWSHEET NOTE
[facility]   Phone: [phone]    Fax: [phone]        Physical Therapy Daily Treatment Note  Date:  2019    Patient Name:  Cezar Hui    :  1965  MRN: [id number]  Medical/Treatment Diagnosis Information:  · Diagnosis: R partial RTC tear, bursitis, and adhesive capsulitis  · DOS: 3/73/23  Insurance/Certification information:  PT Insurance Information: Medical [place]  Physician Information:  Referring Practitioner: Dr. Ade Sanabria of care signed (Y/N): yes 19    Date of Patient follow up with Physician: 19    Date Applied:  19  Functional Assessment Tool Used: UEFI  Score: 34/80, 57.5% limitation  Functional Limitation: Carrying, moving and handling objects      Progress Note: []  Yes  [x]  No  Next due by: 19      Latex Allergy:  [x]NO      []YES  Preferred Language for Healthcare:   [x]English       []other:    Visit # Insurance Allowable   24 35     Pain level:  4/10     SUBJECTIVE:   Patient states since receiving her steroid injection her R shoulder has been sore. She states she iced and rested her shoulder and did not perform many exercises. She says she has done some of the stretches since last visit.       >14 week postop    OBJECTIVE: See eval             ROM PROM AROM  Comment:  18     L R L R     Flexion    144° 155° 103°     Abduction     160° 81°     ER   65° T4 C6     IR     T3 L5     Other             Other                    Strength: deferred secondary to surgery L R Comment:  19         Special Tests Results/Comment: deferred secondary to surgery:  19          RESTRICTIONS/PRECAUTIONS: R RTC repair, SAD, MANISH on 19; ROM restrictions: flexion to 140°, ER to 40°    Exercises:  Exercise/Equipment Resistance/Repetitions Comments   Cardio/Warm-up     Bike          Stretching/PROM     Wand 10x10\" ER at 0° abd to 45°   Supine cane ER 10x10\" @ 45° abd   Supine cane flexion 10x10\"    Table Slides 10x10\" Flex to 140°   Wall slides 10x10\"    flex   Table walk back 10x10\"    Pulleys 5'    Pendulum  30x F/b, s/s      Wrist 4 way 20x3#    UT stretch 10x10\"    Doorway ER 10x10\" At 0° abd   BB adduction 10x10\"    BBIR 10x10\"    Biceps stretch 10x10\"         STRENGTH     Isometrics     Retraction 20x5\"    Hold       Finger extension    Weight shift     Flexion     Abduction     External Rotation     Internal Rotation     Biceps 3#, 2x10    Triceps          PRE's     Flexion     Abduction     External Rotation     Internal Rotation     Shrugs     EXT     Reverse Flys     Serratus     Horizontal Abd with ER     Biceps     Triceps     Retraction          Cable Column/Theraband     External Rotation     Internal Rotation     Shrugs     Lats     Ext Yellow 2x10    Flex     Scapular Retraction Yellow, 2x10    BIC     TRIC     PNF          Neuromuscular Re-ed     Dynamic Stability                              [place]                    Manual/Modalities        Manual PROM flexion and abduction of R shoulder, ER/IR PROM, STM to R bicep and pec 10'                    Therapeutic Exercise and NMR EXR  [x] (51334) Provided verbal/tactile cueing for activities related to strengthening, flexibility, endurance, ROM  for improvements in scapular, scapulothoracic and UE control with self care, reaching, carrying, lifting, house/yardwork, driving/computer work. [x] (58072) Provided verbal/tactile cueing for activities related to improving balance, coordination, kinesthetic sense, posture, motor skill, proprioception  to assist with  scapular, scapulothoracic and UE control with self care, reaching, carrying, lifting, house/yardwork, driving/computer work.     Therapeutic Activities:    [x] (59268 or 60669) Provided verbal/tactile cueing for activities related to improving balance, coordination, kinesthetic sense, posture, motor skill, proprioception and motor activation to allow for proper function of scapular, scapulothoracic and UE control with self care, carrying, lifting, driving/computer work. Home Exercise Program:    [x] (67766) Reviewed/Progressed HEP activities related to strengthening, flexibility, endurance, ROM of scapular, scapulothoracic and UE control with self care, reaching, carrying, lifting, house/yardwork, driving/computer work  [] (92796) Reviewed/Progressed HEP activities related to improving balance, coordination, kinesthetic sense, posture, motor skill, proprioception of scapular, scapulothoracic and UE control with self care, reaching, carrying, lifting, house/yardwork, driving/computer work      Manual Treatments:  PROM / STM / Oscillations-Mobs:  G-I, II, III, IV (PA's, Inf., Post.)  [x] (76686) Provided manual therapy to mobilize soft tissue/joints of cervical/CT, scapular GHJ and UE for the purpose of modulating pain, promoting relaxation,  increasing ROM, reducing/eliminating soft tissue swelling/inflammation/restriction, improving soft tissue extensibility and allowing for proper ROM for normal function with self care, reaching, carrying, lifting, house/yardwork, driving/computer work    Modalities:  CP to R shoulder x10'     Charges:  Timed Code Treatment Minutes: 60   Total Treatment Minutes: 77     [] EVAL (LOW) 46187 (typically 20 minutes face-to-face)  [] EVAL (MOD) 16825 (typically 30 minutes face-to-face)  [] EVAL (HIGH) 62497 (typically 45 minutes face-to-face)  [] RE-EVAL   [x] XD(05695) x  1   [] IONTO    [x] NMR (46372) x  1   [] VASO  [x] Manual (53195) x  1    [] Other:  [x] TA (73627) x  1    [] [facility] T[facility] (27391)  [] ES(attended) (85448)      [] ES (un) (98914):     GOALS:  Patient stated goal: Able to play golf without increased pain in the R shoulder.     Therapist goals for Patient:   Short Term Goals: To be achieved in: 2 weeks  1. Independent in HEP and progression per patient tolerance, in order to prevent re-injury. MET  2.  Patient will have a decrease in pain to facilitate improvement in movement, function, and ADLs as indicated by Functional Deficits. ONGOING     Long Term Goals: To be achieved in: 6 weeks  1. Disability index score of 10% or less for the UEFI to assist with reaching prior level of function. ONGOING  2. Patient will demonstrate increased R shoulder flexion, abduction, ER, and IR AROM to >160°, >160°, T3, and >T8 respectively, to allow for proper joint functioning as indicated by patients Functional Deficits. ONGOING  3. Patient will demonstrate an increase in R shoulder strength in all planes by a half grade or greater to allow for proper functional mobility as indicated by patients Functional Deficits. ONGOING  4. Patient will return to all functional activities without increased symptoms or restriction. ONGOING  5. Patient will be able to complete bathing and dressing activities with R UE without increased pain in the R shoulder so that she may return to PLOF for all ADLs. ONGOING  6. Patient will be able to play 9 holes of golf without increased pain in the R shoulder so that she may return to PLOF. ONGOING     Progression Towards Functional goals:  [x] Patient is progressing as expected towards functional goals listed. [] Progression is slowed due to complexities listed. [] Progression has been slowed due to co-morbidities. [] Plan just implemented, too soon to assess goals progression  [] Other:     ASSESSMENT:  Patient was to hold isometric shrugs today due to hyperactive R UT. She tolerated resisted shoulder extensions today with no pain in the R shoulder, and demonstrating good form. She attempted serratus punches today however began having pain in R bicep and tricep/lat regions and activity was discontinued. During supine wand flexion she also experienced a sharp pain near her end range of motion on first rep, but noted no pain in R shoulder with remaining repetitions.  She was advised to perform wand flexion within a pain free range of motion. She tolerated STM to R bicep tendon, R pec, and ant R shoulder with noted decreased pain. She received PROM of R shoulder in all planes with greatest limitation in ER. She requested CP following today's treatment. She was educated and updated on HEP with all questions/concerns answered. Treatment/Activity Tolerance:  [x] Patient tolerated treatment well [] Patient limited by fatique  [] Patient limited by pain  [] Patient limited by other medical complications  [] Other:     Prognosis: [x] Good [] Fair  [] Poor    Patient Requires Follow-up: [x] Yes  [] No    PLAN: 1-2x/week for 6 weeks  [x] Continue per plan of care [] Alter current plan (see comments)  [] Plan of care initiated [] Hold pending MD visit [] Discharge    Electronically signed by: Luan Díaz Student Physical Therapist      Therapist was present, directed the patient's care, made skilled judgement, and was responsible for assessment and treatment of the patient.            Cheyenne Young DPT, OMT-C      [address] [place] [address] [address] PT license: [id number]  [place] PT license: [id number]

## 2019-06-18 ENCOUNTER — OFFICE VISIT (OUTPATIENT)
Dept: ORTHOPEDIC SURGERY | Age: 54
End: 2019-06-18
Payer: COMMERCIAL

## 2019-06-18 VITALS
HEART RATE: 89 BPM | DIASTOLIC BLOOD PRESSURE: 77 MMHG | WEIGHT: 121.03 LBS | HEIGHT: 66 IN | BODY MASS INDEX: 19.45 KG/M2 | SYSTOLIC BLOOD PRESSURE: 120 MMHG

## 2019-06-18 DIAGNOSIS — Z98.890 STATUS POST RIGHT ROTATOR CUFF REPAIR: Primary | ICD-10-CM

## 2019-06-18 DIAGNOSIS — M75.01 ADHESIVE CAPSULITIS OF RIGHT SHOULDER: ICD-10-CM

## 2019-06-18 PROCEDURE — 99213 OFFICE O/P EST LOW 20 MIN: CPT | Performed by: PHYSICIAN ASSISTANT

## 2019-06-18 RX ORDER — MELOXICAM 15 MG/1
15 TABLET ORAL DAILY
Qty: 30 TABLET | Refills: 3 | Status: SHIPPED | OUTPATIENT
Start: 2019-06-18 | End: 2019-08-15

## 2019-06-18 NOTE — LETTER
[] Ultrasound     [] Rhythmic stabilization  [] Iontophoresis    [] Core strengthening   [] Moist heat     [] Sports specific program:   [] Massage         [x] Cryotherapy      [] Electrical stimulation     [] Paraffin  [] Whirlpool  [] TENS    [x] Home exercise program (copy to patient).         Perform exercises for:   15     minutes    3      times/day  [x] Supervised physical therapy  Frequency: []  1x week  [x] 2x week  [] 3x week  [] Other:   Duration: [] 2 weeks   [] 4 weeks  [x] 6 weeks  [] Other:     Additional Instructions:              Amador Mcclelland PA-C

## 2019-06-25 ENCOUNTER — TREATMENT (OUTPATIENT)
Dept: PHYSICAL THERAPY | Age: 54
End: 2019-06-25
Payer: COMMERCIAL

## 2019-06-25 DIAGNOSIS — M75.111 PARTIAL NONTRAUMATIC TEAR OF ROTATOR CUFF, RIGHT: ICD-10-CM

## 2019-06-25 DIAGNOSIS — M75.01 ADHESIVE CAPSULITIS OF RIGHT SHOULDER: Primary | ICD-10-CM

## 2019-06-25 DIAGNOSIS — M25.611 DECREASED ROM OF RIGHT SHOULDER: ICD-10-CM

## 2019-06-25 DIAGNOSIS — M25.511 PAIN IN JOINT OF RIGHT SHOULDER: ICD-10-CM

## 2019-06-25 PROCEDURE — 97110 THERAPEUTIC EXERCISES: CPT | Performed by: PHYSICAL THERAPIST

## 2019-06-25 PROCEDURE — 97112 NEUROMUSCULAR REEDUCATION: CPT | Performed by: PHYSICAL THERAPIST

## 2019-06-25 PROCEDURE — 97530 THERAPEUTIC ACTIVITIES: CPT | Performed by: PHYSICAL THERAPIST

## 2019-06-25 PROCEDURE — 97140 MANUAL THERAPY 1/> REGIONS: CPT | Performed by: PHYSICAL THERAPIST

## 2019-06-25 NOTE — PLAN OF CARE
[facility]   Phone: [phone]    Fax: [phone]   Physical Therapy Re-Certification Plan of Care    Dear Dr. Zachary Fernandes,    We had the pleasure of treating the following patient for physical therapy services at [address] [place] [address]. A summary of our findings can be found in the updated assessment below. This includes our plan of care. If you have any questions or concerns regarding these findings, please do not hesitate to contact me at the office phone number checked above. Thank you for the referral.     Physician Signature:________________________________Date:__________________  By signing above (or electronic signature), therapists plan is approved by physician      Overall Response to Treatment:   []Patient is responding well to treatment and improvement is noted with regards  to goals   []Patient should continue to improve in reasonable time if they continue HEP   []Patient has plateaued and is no longer responding to skilled PT intervention    []Patient is getting worse and would benefit from return to referring MD   []Patient unable to adhere to initial POC   [x]Other: Patient continues to demonstrate slow progress in R shoulder passive and active ROM, with significant muscle guarding still noted with all ROM activities.           Physical Therapy Daily Treatment Note  Date:  2019    Patient Name:  Collette Bora    :  1965  MRN: [id number]  Medical/Treatment Diagnosis Information:  · Diagnosis: R partial RTC tear, bursitis, and adhesive capsulitis  · DOS:   Insurance/Certification information:  PT Insurance Information: Medical [place]  Physician Information:  Referring Practitioner: Dr. Kay Tran of care signed (Y/N): sent 19    Date of Patient follow up with Physician: 19    Date Applied:  19   Functional Assessment Tool Used: UEFI  Score: 34/80, 57.5% limitation  Functional Limitation: Carrying, moving and handling objects      Progress Note: [x]  Yes  []  No  Next due by: 7/25/19      Latex Allergy:  [x]NO      []YES  Preferred Language for Healthcare:   [x]English       []other:    Visit # Insurance Allowable   25 35     Pain level:  4/10     SUBJECTIVE:   Patient reports that she has been very cautious with OH activity secondary to pain at the [facility] joint. She notes that pain is not consistent but is sharp when it occurs. She notes that she is still just very frustrated with limitations in ROM. >14 week postop    OBJECTIVE: See eval             ROM PROM AROM  Comment:  6/25/18     L R L R     Flexion    140° 155° 100°     Abduction     160° 97°     ER   65° T4 C7*     IR     T3 T12     Other             Other                    Strength: deferred secondary to surgery L R Comment:  6/25/19         Special Tests Results/Comment: deferred secondary to surgery:  6/25/19          RESTRICTIONS/PRECAUTIONS: R RTC repair, SAD, MANISH on 2/11/19;      Exercises:  Exercise/Equipment Resistance/Repetitions Comments   Cardio/Warm-up     Bike          Stretching/PROM     Wand 5x10\" ER at 0° abd to 45°   Supine cane ER 5x10\" @ 45° abd   Supine cane flexion 5x10\"    Table Slides 5x10\" scaption   Wall slides 5x10\"    flex   Table walk back 5x10\"    Pulleys 3'    F/b, s/s      Wrist 4 way     UT stretch 10x10\"    Doorway ER  At 0° abd   BB adduction 5x10\"    BBIR 5x10\"    Biceps stretch 5x10\"         STRENGTH     Isometrics     Retraction 20x5\"    Hold       Finger extension    Weight shift     Flexion     Abduction     External Rotation     Internal Rotation     HOLD   Triceps          PRE's     Flexion     Abduction     External Rotation     Internal Rotation     Shrugs     EXT     Reverse Flys     Serratus     Horizontal Abd with ER     Biceps     Triceps     Retraction          Cable Column/Theraband     External Rotation     Internal Rotation     Shrugs     Lats     Ext Yellow 2x10    Flex     Scapular Retraction Yellow, 2x10    BIC     TRIC     PNF          Neuromuscular Re-ed     Dynamic Stability                              [place]                    Manual/Modalities        Manual PROM flexion and abduction of R shoulder, ER/IR PROM, STM to R bicep and pec, and post scap musculature 12'                    Therapeutic Exercise and NMR EXR  [x] (57817) Provided verbal/tactile cueing for activities related to strengthening, flexibility, endurance, ROM  for improvements in scapular, scapulothoracic and UE control with self care, reaching, carrying, lifting, house/yardwork, driving/computer work. [x] (18135) Provided verbal/tactile cueing for activities related to improving balance, coordination, kinesthetic sense, posture, motor skill, proprioception  to assist with  scapular, scapulothoracic and UE control with self care, reaching, carrying, lifting, house/yardwork, driving/computer work. Therapeutic Activities:    [x] (57074 or 97060) Provided verbal/tactile cueing for activities related to improving balance, coordination, kinesthetic sense, posture, motor skill, proprioception and motor activation to allow for proper function of scapular, scapulothoracic and UE control with self care, carrying, lifting, driving/computer work.      Home Exercise Program:    [x] (42387) Reviewed/Progressed HEP activities related to strengthening, flexibility, endurance, ROM of scapular, scapulothoracic and UE control with self care, reaching, carrying, lifting, house/yardwork, driving/computer work  [] (34064) Reviewed/Progressed HEP activities related to improving balance, coordination, kinesthetic sense, posture, motor skill, proprioception of scapular, scapulothoracic and UE control with self care, reaching, carrying, lifting, house/yardwork, driving/computer work      Manual Treatments:  PROM / STM / Oscillations-Mobs:  G-I, II, III, IV (PA's, Inf., Post.)  [x] (92031) Provided manual therapy to mobilize soft tissue/joints of cervical/CT, scapular GHJ and UE for the purpose of modulating pain, promoting relaxation,  increasing ROM, reducing/eliminating soft tissue swelling/inflammation/restriction, improving soft tissue extensibility and allowing for proper ROM for normal function with self care, reaching, carrying, lifting, house/yardwork, driving/computer work    Modalities:  CP to R shoulder x10'     Charges:  Timed Code Treatment Minutes: 55   Total Treatment Minutes: 76     [] EVAL (LOW) 83175 (typically 20 minutes face-to-face)  [] EVAL (MOD) 92395 (typically 30 minutes face-to-face)  [] EVAL (HIGH) 95350 (typically 45 minutes face-to-face)  [] RE-EVAL   [x] IJ(08018) x  1   [] IONTO    [x] NMR (17516) x  1   [] VASO  [x] Manual (20816) x  1    [] Other:  [x] TA (88007) x  1    [] Mech Traction (93356)  [] ES(attended) (96137)      [] ES (un) (89082):     GOALS:  Patient stated goal: Able to play golf without increased pain in the R shoulder.     Therapist goals for Patient:   Short Term Goals: To be achieved in: 2 weeks  1. Independent in HEP and progression per patient tolerance, in order to prevent re-injury. MET  2. Patient will have a decrease in pain to facilitate improvement in movement, function, and ADLs as indicated by Functional Deficits. ONGOING     Long Term Goals: To be achieved in: 6 weeks  1. Disability index score of 10% or less for the UEFI to assist with reaching prior level of function. ONGOING  2. Patient will demonstrate increased R shoulder flexion, abduction, ER, and IR AROM to >160°, >160°, T3, and >T8 respectively, to allow for proper joint functioning as indicated by patients Functional Deficits. ONGOING  3. Patient will demonstrate an increase in R shoulder strength in all planes by a half grade or greater to allow for proper functional mobility as indicated by patients Functional Deficits. ONGOING  4. Patient will return to all functional activities without increased symptoms or restriction. ONGOING  5. Patient will be able to complete bathing and dressing activities with R UE without increased pain in the R shoulder so that she may return to PLOF for all ADLs. ONGOING  6. Patient will be able to play 9 holes of golf without increased pain in the R shoulder so that she may return to PLOF. ONGOING     Progression Towards Functional goals:  [x] Patient is progressing as expected towards functional goals listed. [] Progression is slowed due to complexities listed. [] Progression has been slowed due to co-morbidities. [] Plan just implemented, too soon to assess goals progression  [] Other:     ASSESSMENT:  Patient continues to demonstrate slow progress in R shoulder active and passive ROM in all planes. Greatest limitations remaining in R shoulder flexion and ER, with the greatest progress in [address]. She and PT spent a large portion of today's session readjusting her POC, sets and reps, and HEP. Patient is in agreement with adjusted plan and HEP. Patient would benefit from continued skilled PT services to address remaining deficits in R shoulder ROM and function so that she may return to PLOF for all ADLs.      Treatment/Activity Tolerance:  [x] Patient tolerated treatment well [] Patient limited by fatique  [] Patient limited by pain  [] Patient limited by other medical complications  [] Other:     Prognosis: [x] Good [] Fair  [] Poor    Patient Requires Follow-up: [x] Yes  [] No    PLAN: 1-2x/week for 6 weeks  [x] Continue per plan of care [] Alter current plan (see comments)  [] Plan of care initiated [] Hold pending MD visit [] Discharge    Electronically signed by:      Kendal Solano DPT, OMT-C      [place] PT license: [id number]  [place] PT license: [id number]

## 2019-07-09 ENCOUNTER — TELEPHONE (OUTPATIENT)
Dept: ORTHOPEDIC SURGERY | Age: 54
End: 2019-07-09

## 2019-07-17 ENCOUNTER — OFFICE VISIT (OUTPATIENT)
Dept: ORTHOPEDIC SURGERY | Age: 54
End: 2019-07-17
Payer: COMMERCIAL

## 2019-07-17 VITALS
DIASTOLIC BLOOD PRESSURE: 82 MMHG | BODY MASS INDEX: 19.45 KG/M2 | HEIGHT: 66 IN | WEIGHT: 121.03 LBS | SYSTOLIC BLOOD PRESSURE: 121 MMHG | HEART RATE: 95 BPM

## 2019-07-17 DIAGNOSIS — Z98.890 S/P ROTATOR CUFF REPAIR: ICD-10-CM

## 2019-07-17 DIAGNOSIS — M75.01 ADHESIVE CAPSULITIS OF RIGHT SHOULDER: Primary | ICD-10-CM

## 2019-07-17 PROCEDURE — 99213 OFFICE O/P EST LOW 20 MIN: CPT | Performed by: ORTHOPAEDIC SURGERY

## 2019-07-17 RX ORDER — METHYLPREDNISOLONE 4 MG/1
4 TABLET ORAL DAILY
Qty: 45 TABLET | Refills: 0 | Status: SHIPPED | OUTPATIENT
Start: 2019-07-17 | End: 2019-08-01

## 2019-07-17 NOTE — PROGRESS NOTES
Azithromycin      Upset stomach       Physical Exam:  Vitals:    07/17/19 1115   BP: 121/82   Pulse: 95     General: Zechariah Bain is a healthy and well appearing 46 y.o. female who is sitting comfortably in our office in acute distress. Alert and oriented x 3    General/Appearance: Alert and oriented and in no apparent distress. Skin:  There are no skin lesions, cellulitis, or extreme edema. The patient has warm and well-perfused Bilateral upper extremities with brisk capillary refill.      right Shoulder Exam:  Inspection:  No gross deformities, no signs of infection. Palpation:  Very [facility] [place] [facility], +TTP over the anterior shoulder and rotator interval    Active Range of Motion: Forward elevation of 90, external rotation with elbow at the side 10 degrees, internal rotation to the back is T12 vs T6 on the left, with arm at 90 degrees of abduction ER and IR are 30 and 40 respectably, cross arm adduction is 20 cm from the antecubital fossa. Passive Range of Motion: Passively forward elevation can be further increased to 110, ER 30 degrees with hard end feel    Strength: 4+/5 ER, IR strength     Special Tests:   No Isai muscle deformity. Neurovascular: Sensation to light touch is intact, no motor deficits, palpable radial pulses 2+    Neuro: alert. oriented  Eyes: Extra-ocular muscles intact  Mouth: Oral mucosa moist. No perioral lesions  Pulm: Respirations unlabored and regular. Skin: warm, well perfused    Additional Examinations:    Examination of the contralateral extremity does not show any tenderness, deformity or injury. Range of motion is unremarkable. There is no gross instability. There are no rashes, ulcerations or lesions.  Strength and tone are normal.    Assessment:  Zechariah Bain is a 46 y.o.  female who underwent a right shoulder diagnostic arthroscopy, debridement, decompression and capsular release and rotator cuff repair with augmentation on 2/11/2019 -  Currently with postsurgical stiffness which has plateaued despite exhaustive conservative treatment including PT, intraarticular and AC joint injections, short term medrol dose back    Impression:  Encounter Diagnoses   Name Primary?  Adhesive capsulitis of right shoulder Yes    S/P rotator cuff repair        Office Procedures:  Orders Placed This Encounter   Procedures    Ambulatory referral to Physical Therapy     Referral Priority:   Routine     Referral Type:   Eval and Treat     Referral Reason:   Specialty Services Required     Requested Specialty:   Physical Therapy     Number of Visits Requested:   1     Plan: I want her to continue working with physical therapy to work on range of motion and stretching exercises. She definitely has a component of adhesive capsulitis although the main component of her surgery was focused on the getting the rotator cuff to heel. I would like to try her with another 15-day Medrol taper. We will continue with conservative management of her frozen shoulder and if needed down the road we will consider a arthroscopic lysis of adhesions and extensive debridement. They are in agreement with the plan. Nelia Bradley. Stanton Campbell, [address] [place] [address] Sports Medicine and [facility]     The encounter with Josue Donis was supervised by Dr. Lyssa Hutchinson who personally examined the patient and reviewed the plan. This dictation was performed with a verbal recognition program (DRAGON) and it was checked for errors. It is possible that there are still dictated errors within this office note. If so, please bring any errors to my attention for an addendum. All efforts were made to ensure that this office note is accurate.   _______________  I was physically present and personally supervised the Orthopaedic Sports Medicine Fellow in the evaluation and development of a treatment plan for this patient.  I personally interviewed the patient and performed a physical

## 2019-07-22 ENCOUNTER — TREATMENT (OUTPATIENT)
Dept: PHYSICAL THERAPY | Age: 54
End: 2019-07-22
Payer: COMMERCIAL

## 2019-07-22 DIAGNOSIS — M75.111 PARTIAL NONTRAUMATIC TEAR OF ROTATOR CUFF, RIGHT: ICD-10-CM

## 2019-07-22 DIAGNOSIS — M25.611 DECREASED ROM OF RIGHT SHOULDER: ICD-10-CM

## 2019-07-22 DIAGNOSIS — M75.01 ADHESIVE CAPSULITIS OF RIGHT SHOULDER: Primary | ICD-10-CM

## 2019-07-22 DIAGNOSIS — M25.511 PAIN IN JOINT OF RIGHT SHOULDER: ICD-10-CM

## 2019-07-22 PROCEDURE — 97530 THERAPEUTIC ACTIVITIES: CPT | Performed by: PHYSICAL THERAPIST

## 2019-07-22 PROCEDURE — 97140 MANUAL THERAPY 1/> REGIONS: CPT | Performed by: PHYSICAL THERAPIST

## 2019-07-22 PROCEDURE — 97110 THERAPEUTIC EXERCISES: CPT | Performed by: PHYSICAL THERAPIST

## 2019-07-22 PROCEDURE — 97112 NEUROMUSCULAR REEDUCATION: CPT | Performed by: PHYSICAL THERAPIST

## 2019-07-22 NOTE — FLOWSHEET NOTE
[facility]   Phone: [phone]    Fax: [phone]        Physical Therapy Daily Treatment Note  Date:  2019    Patient Name:  Mag Barba    :  1965  MRN: [id number]  Medical/Treatment Diagnosis Information:  · Diagnosis: R partial RTC tear, bursitis, and adhesive capsulitis  · DOS:   Insurance/Certification information:  PT Insurance Information: Medical [place]  Physician Information:  Referring Practitioner: Dr. Carmen Villatoro of care signed (Y/N): yes 19    Date of Patient follow up with Physician: 19    Date Applied:  19   Functional Assessment Tool Used: UEFI  Score: 34/80, 57.5% limitation  Functional Limitation: Carrying, moving and handling objects      Progress Note: [x]  Yes  []  No  Next due by: 19      Latex Allergy:  [x]NO      []YES  Preferred Language for Healthcare:   [x]English       []other:    Visit # Insurance Allowable   26 35     Pain level:  4/10     SUBJECTIVE:   Patient report that she saw Dr. Lois Pitts last week and he re-diagnosed her frozen shoulder on the R. She notes that behind the back is improving but she still has difficulty with OH reaching and ER. She notes that last Friday was a really good day, with pain only being about a 2/10. She notes that she has not been as stiff in the mornings, but rather some soreness but she notes this has been more encouraging to her.    >14 week postop    OBJECTIVE: See eval             ROM PROM AROM  Comment:  18     L R L R     Flexion    142° 155° 100°     Abduction     160° 97°     ER    65° T4 C7*     IR     T3 T12     Other             Other                    Strength: deferred secondary to surgery L R Comment:  19         Special Tests Results/Comment: deferred secondary to surgery:  19          RESTRICTIONS/PRECAUTIONS: R RTC repair, SAD, MANISH on 19;      Exercises:  Exercise/Equipment Resistance/Repetitions Comments   Cardio/Warm-up

## 2019-07-23 ENCOUNTER — TELEPHONE (OUTPATIENT)
Dept: ORTHOPEDIC SURGERY | Age: 54
End: 2019-07-23

## 2019-07-23 RX ORDER — METHYLPREDNISOLONE 4 MG/1
TABLET ORAL
Qty: 1 KIT | Refills: 0 | Status: SHIPPED | OUTPATIENT
Start: 2019-07-23 | End: 2019-07-29

## 2019-07-29 ENCOUNTER — TREATMENT (OUTPATIENT)
Dept: PHYSICAL THERAPY | Age: 54
End: 2019-07-29
Payer: COMMERCIAL

## 2019-07-29 DIAGNOSIS — M75.01 ADHESIVE CAPSULITIS OF RIGHT SHOULDER: Primary | ICD-10-CM

## 2019-07-29 DIAGNOSIS — M25.611 DECREASED ROM OF RIGHT SHOULDER: ICD-10-CM

## 2019-07-29 DIAGNOSIS — M25.511 PAIN IN JOINT OF RIGHT SHOULDER: ICD-10-CM

## 2019-07-29 DIAGNOSIS — M75.111 PARTIAL NONTRAUMATIC TEAR OF ROTATOR CUFF, RIGHT: ICD-10-CM

## 2019-07-29 PROCEDURE — 97530 THERAPEUTIC ACTIVITIES: CPT | Performed by: PHYSICAL THERAPIST

## 2019-07-29 PROCEDURE — 97140 MANUAL THERAPY 1/> REGIONS: CPT | Performed by: PHYSICAL THERAPIST

## 2019-07-29 PROCEDURE — 97110 THERAPEUTIC EXERCISES: CPT | Performed by: PHYSICAL THERAPIST

## 2019-07-29 NOTE — PLAN OF CARE
[facility]   Phone: [phone]    Fax: [phone]   Physical Therapy Re-Certification Plan of Care    Dear Dr. Evaristo Trejo,    We had the pleasure of treating the following patient for physical therapy services at [address] [place] [address]. A summary of our findings can be found in the updated assessment below. This includes our plan of care. If you have any questions or concerns regarding these findings, please do not hesitate to contact me at the office phone number checked above. Thank you for the referral.     Physician Signature:________________________________Date:__________________  By signing above (or electronic signature), therapists plan is approved by physician      Overall Response to Treatment:   []Patient is responding well to treatment and improvement is noted with regards  to goals   []Patient should continue to improve in reasonable time if they continue HEP   []Patient has plateaued and is no longer responding to skilled PT intervention    []Patient is getting worse and would benefit from return to referring MD   []Patient unable to adhere to initial POC   [x]Other: Patient is progressing slowly. She took a 1 month hiatus from skilled PT services for R shoulder and has just returned for skilled treatment.         Physical Therapy Daily Treatment Note  Date:  2019    Patient Name:  Rogelio Bruce    :  1965  MRN: [id number]  Medical/Treatment Diagnosis Information:  · Diagnosis: R partial RTC tear, bursitis, and adhesive capsulitis  · DOS: 3/99/53  Insurance/Certification information:  PT Insurance Information: Medical [place]  Physician Information:  Referring Practitioner: Dr. Jian Ramos of care signed (Y/N): sent 19    Date of Patient follow up with Physician: 8/15/19    Date Applied:  19   Functional Assessment Tool Used: UEFI  Score: 43/80, 46.25% limitation  Functional Limitation: Carrying, moving and handling UE for the purpose of modulating pain, promoting relaxation,  increasing ROM, reducing/eliminating soft tissue swelling/inflammation/restriction, improving soft tissue extensibility and allowing for proper ROM for normal function with self care, reaching, carrying, lifting, house/yardwork, driving/computer work    Modalities:  CP to R shoulder x10'     Charges:  Timed Code Treatment Minutes: 45   Total Treatment Minutes: 65     [] EVAL (LOW) 80231 (typically 20 minutes face-to-face)  [] EVAL (MOD) 93354 (typically 30 minutes face-to-face)  [] EVAL (HIGH) 26299 (typically 45 minutes face-to-face)  [] RE-EVAL   [x] HG(93484) x  1   [] IONTO    [] NMR (01453) x      [] VASO  [x] Manual (09595) x  1    [] Other:  [x] TA (74156) x  1    [] Mech Traction (55861)  [] ES(attended) (16598)      [] ES (un) (01090):     GOALS:  Patient stated goal: Able to play golf without increased pain in the R shoulder.     Therapist goals for Patient:   Short Term Goals: To be achieved in: 2 weeks  1. Independent in HEP and progression per patient tolerance, in order to prevent re-injury. MET  2. Patient will have a decrease in pain to facilitate improvement in movement, function, and ADLs as indicated by Functional Deficits. ONGOING     Long Term Goals: To be achieved in: 6 weeks  1. Disability index score of 10% or less for the UEFI to assist with reaching prior level of function. ONGOING  2. Patient will demonstrate increased R shoulder flexion, abduction, ER, and IR AROM to >160°, >160°, T3, and >T8 respectively, to allow for proper joint functioning as indicated by patients Functional Deficits. ONGOING  3. Patient will demonstrate an increase in R shoulder strength in all planes by a half grade or greater to allow for proper functional mobility as indicated by patients Functional Deficits. ONGOING  4. Patient will return to all functional activities without increased symptoms or restriction. ONGOING  5.  Patient will be able to complete bathing and dressing activities with R UE without increased pain in the R shoulder so that she may return to PLOF for all ADLs. ONGOING  6. Patient will be able to play 9 holes of golf without increased pain in the R shoulder so that she may return to PLOF. ONGOING     Progression Towards Functional goals:  [x] Patient is progressing as expected towards functional goals listed. [] Progression is slowed due to complexities listed. [] Progression has been slowed due to co-morbidities. [] Plan just implemented, too soon to assess goals progression  [] Other:     ASSESSMENT: Patient demonstrates slow progress toward all LTGs previously set by PT. She continues to have significant limitations in R shoulder flexion, ER, and abduction AROM. Her R shoulder IR AROM is significantly improved. She exhibits significant tightness of R subscapularis and impaired scapular-humeral rhythm. Improved R shoulder flexion PROM was noted following manual release to the R subscap. PT instructed patient that continuing with frequent stretching HEP and continuing to use the R UE is very important. Patient would benefit from continued skilled PT services to address remaining deficits in R shoulder ROM both actively and passively so that she may return to PLOF for all ADLs.     Treatment/Activity Tolerance:  [x] Patient tolerated treatment well [] Patient limited by fatique  [] Patient limited by pain  [] Patient limited by other medical complications  [] Other:     Prognosis: [x] Good [] Fair  [] Poor    Patient Requires Follow-up: [x] Yes  [] No    PLAN: 1-2x/week for 6 weeks  [x] Continue per plan of care [] Alter current plan (see comments)  [] Plan of care initiated [] Hold pending MD visit [] Discharge    Electronically signed by:      Nelson Cordoba DPT, OMT-C      [address] [facility] [employer] PT license: [id number]  [place] PT license: [id number]

## 2019-08-05 ENCOUNTER — TREATMENT (OUTPATIENT)
Dept: PHYSICAL THERAPY | Age: 54
End: 2019-08-05
Payer: COMMERCIAL

## 2019-08-05 DIAGNOSIS — M25.611 DECREASED ROM OF RIGHT SHOULDER: ICD-10-CM

## 2019-08-05 DIAGNOSIS — M75.01 ADHESIVE CAPSULITIS OF RIGHT SHOULDER: Primary | ICD-10-CM

## 2019-08-05 DIAGNOSIS — M75.111 PARTIAL NONTRAUMATIC TEAR OF ROTATOR CUFF, RIGHT: ICD-10-CM

## 2019-08-05 DIAGNOSIS — M25.511 PAIN IN JOINT OF RIGHT SHOULDER: ICD-10-CM

## 2019-08-05 PROCEDURE — 97112 NEUROMUSCULAR REEDUCATION: CPT | Performed by: PHYSICAL THERAPIST

## 2019-08-05 PROCEDURE — 97530 THERAPEUTIC ACTIVITIES: CPT | Performed by: PHYSICAL THERAPIST

## 2019-08-05 PROCEDURE — 97110 THERAPEUTIC EXERCISES: CPT | Performed by: PHYSICAL THERAPIST

## 2019-08-05 PROCEDURE — 97140 MANUAL THERAPY 1/> REGIONS: CPT | Performed by: PHYSICAL THERAPIST

## 2019-08-05 NOTE — FLOWSHEET NOTE
[facility]   Phone: [phone]    Fax: [phone]      Physical Therapy Daily Treatment Note  Date:  2019    Patient Name:  Tanner Crespo    :  1965  MRN: [id number]  Medical/Treatment Diagnosis Information:  · Diagnosis: R partial RTC tear, bursitis, and adhesive capsulitis  · DOS:   Insurance/Certification information:  PT Insurance Information: Medical [place]  Physician Information:  Referring Practitioner: Dr. Abel [place] of care signed (Y/N): yes 19    Date of Patient follow up with Physician: 8/15/19    Date Applied:  19   Functional Assessment Tool Used: UEFI  Score: 43/80, 46.25% limitation  Functional Limitation: Carrying, moving and handling objects      Progress Note: []  Yes  [x]  No  Next due by: 19      Latex Allergy:  [x]NO      []YES  Preferred Language for Healthcare:   [x]English       []other:    Visit # Insurance Allowable   28 35     Pain level:  3-10     SUBJECTIVE:   Patient report that she is more sore today as she did supine cane flexion and pulleys at home yesterday. She notes that following last session's manual treatment, she felt a lot looser and had the first \"glimpse of hope\" in a long time. >5 months postop    OBJECTIVE: See eval             ROM PROM AROM  Comment:  18     L R L R     Flexion    156° 155° 115°     Abduction     160° 100°*     ER    55° T4 T1*     IR     T3 T10     Other             Other                    Strength: deferred secondary to surgery L R Comment:  19         Special Tests Results/Comment: deferred secondary to surgery:  19          RESTRICTIONS/PRECAUTIONS: R RTC repair, SAD, MANISH on 19;      Exercises:  Exercise/Equipment Resistance/Repetitions Comments   Cardio/Warm-up     Bike          Stretching/PROM     Wand 5x10\" ER at 0° abd to 45°   Supine cane ER 5x10\" @ 45° abd   Supine cane flexion 5x10\"    Table Slides 5x10\" Flex and scaption   Wall complications  [] Other:     Prognosis: [x] Good [] Fair  [] Poor    Patient Requires Follow-up: [x] Yes  [] No    PLAN: 1-2x/week for 6 weeks  [x] Continue per plan of care [] Alter current plan (see comments)  [] Plan of care initiated [] Hold pending MD visit [] Discharge    Electronically signed by:      Halina Jeans, DPT, RICARDO      [place] PT license: [id number]  [place] PT license: [id number]

## 2019-08-12 ENCOUNTER — TREATMENT (OUTPATIENT)
Dept: PHYSICAL THERAPY | Age: 54
End: 2019-08-12
Payer: COMMERCIAL

## 2019-08-12 DIAGNOSIS — M75.01 ADHESIVE CAPSULITIS OF RIGHT SHOULDER: Primary | ICD-10-CM

## 2019-08-12 DIAGNOSIS — M75.111 PARTIAL NONTRAUMATIC TEAR OF ROTATOR CUFF, RIGHT: ICD-10-CM

## 2019-08-12 DIAGNOSIS — M25.611 DECREASED ROM OF RIGHT SHOULDER: ICD-10-CM

## 2019-08-12 DIAGNOSIS — M25.511 PAIN IN JOINT OF RIGHT SHOULDER: ICD-10-CM

## 2019-08-12 PROCEDURE — 97110 THERAPEUTIC EXERCISES: CPT | Performed by: PHYSICAL THERAPIST

## 2019-08-12 PROCEDURE — 97140 MANUAL THERAPY 1/> REGIONS: CPT | Performed by: PHYSICAL THERAPIST

## 2019-08-12 PROCEDURE — 97530 THERAPEUTIC ACTIVITIES: CPT | Performed by: PHYSICAL THERAPIST

## 2019-08-12 PROCEDURE — 97112 NEUROMUSCULAR REEDUCATION: CPT | Performed by: PHYSICAL THERAPIST

## 2019-08-12 NOTE — FLOWSHEET NOTE
flex   Table walk back 5x10\"    Pulleys 5x10\"    F/b, s/s      Wrist 4 way     UT stretch 10x10\"    Doorway ER  At 0° abd      BBIR 5x10\"    Biceps stretch 5x10\"    Supine ER LLLD 6x10\" Hand behind head        STRENGTH     Isometrics     Retraction 20x5\"    Hold       Finger extension    Weight shift     Flexion     Abduction     External Rotation     Internal Rotation     Biceps 3#, 2x10    Triceps          PRE's     Flexion     Abduction     External Rotation     Internal Rotation     Shrugs     EXT     Reverse Flys     Serratus     Horizontal Abd with ER     Biceps     Triceps     Retraction          Cable Column/Theraband     External Rotation     Internal Rotation     Shrugs     Lats     Ext HOLD   Flex    Scapular Retraction HOLD   BIC     TRIC     PNF          Neuromuscular Re-ed     Dynamic Stability                              Plyoback                    Manual/Modalities        Manual PROM flexion and abduction of R shoulder, ER/IR PROM, Trigger point release of R subscap,  10'                    Therapeutic Exercise and NMR EXR  [x] (20272) Provided verbal/tactile cueing for activities related to strengthening, flexibility, endurance, ROM  for improvements in scapular, scapulothoracic and UE control with self care, reaching, carrying, lifting, house/yardwork, driving/computer work. [x] (50329) Provided verbal/tactile cueing for activities related to improving balance, coordination, kinesthetic sense, posture, motor skill, proprioception  to assist with  scapular, scapulothoracic and UE control with self care, reaching, carrying, lifting, house/yardwork, driving/computer work.     Therapeutic Activities:    [x] (91306 or 52132) Provided verbal/tactile cueing for activities related to improving balance, coordination, kinesthetic sense, posture, motor skill, proprioception and motor activation to allow for proper function of scapular, scapulothoracic and UE control with self care, carrying, lifting,

## 2019-08-15 ENCOUNTER — OFFICE VISIT (OUTPATIENT)
Dept: ORTHOPEDIC SURGERY | Age: 54
End: 2019-08-15
Payer: COMMERCIAL

## 2019-08-15 VITALS
SYSTOLIC BLOOD PRESSURE: 109 MMHG | BODY MASS INDEX: 19.45 KG/M2 | HEART RATE: 72 BPM | HEIGHT: 66 IN | WEIGHT: 121.03 LBS | DIASTOLIC BLOOD PRESSURE: 65 MMHG

## 2019-08-15 DIAGNOSIS — M75.01 ADHESIVE CAPSULITIS OF RIGHT SHOULDER: ICD-10-CM

## 2019-08-15 DIAGNOSIS — Z98.890 S/P ROTATOR CUFF REPAIR: ICD-10-CM

## 2019-08-15 DIAGNOSIS — Z98.890 STATUS POST RIGHT ROTATOR CUFF REPAIR: Primary | ICD-10-CM

## 2019-08-15 PROCEDURE — 99213 OFFICE O/P EST LOW 20 MIN: CPT | Performed by: ORTHOPAEDIC SURGERY

## 2019-08-15 RX ORDER — CITALOPRAM 10 MG/1
TABLET ORAL
COMMUNITY
Start: 2019-07-10

## 2019-08-20 ENCOUNTER — TREATMENT (OUTPATIENT)
Dept: PHYSICAL THERAPY | Age: 54
End: 2019-08-20
Payer: COMMERCIAL

## 2019-08-20 DIAGNOSIS — M75.111 PARTIAL NONTRAUMATIC TEAR OF ROTATOR CUFF, RIGHT: ICD-10-CM

## 2019-08-20 DIAGNOSIS — M25.511 PAIN IN JOINT OF RIGHT SHOULDER: ICD-10-CM

## 2019-08-20 DIAGNOSIS — M25.611 DECREASED ROM OF RIGHT SHOULDER: ICD-10-CM

## 2019-08-20 DIAGNOSIS — M75.01 ADHESIVE CAPSULITIS OF RIGHT SHOULDER: Primary | ICD-10-CM

## 2019-08-20 PROCEDURE — 97140 MANUAL THERAPY 1/> REGIONS: CPT | Performed by: PHYSICAL THERAPIST

## 2019-08-20 PROCEDURE — 97530 THERAPEUTIC ACTIVITIES: CPT | Performed by: PHYSICAL THERAPIST

## 2019-08-20 PROCEDURE — 97110 THERAPEUTIC EXERCISES: CPT | Performed by: PHYSICAL THERAPIST

## 2019-08-20 NOTE — FLOWSHEET NOTE
flexion 8x10\"    Table Slides 5x10\" Flex and scaption   Wall slides 5x10\"    flex   Table walk back 5x10\"    HOLD   F/b, s/s      Wrist 4 way     UT stretch 10x10\"    Doorway ER  At 0° abd      BBIR 5x10\"    Biceps stretch 5x10\"    Supine ER LLLD 5x10\" Hands behind head        STRENGTH     Isometrics     Retraction 20x5\"    Hold       Finger extension    Weight shift     Flexion     Abduction     External Rotation     Internal Rotation     Biceps 3#, 2x10    Triceps          PRE's     Flexion     Abduction     External Rotation     Internal Rotation     Shrugs     EXT     Reverse Flys     Serratus     Horizontal Abd with ER     Biceps     Triceps     Retraction          Cable Column/Theraband     External Rotation     Internal Rotation     Shrugs     Lats     Ext Yellow 2x10    Flex     Scapular Retraction Yellow, 2x10    BIC     TRIC     PNF          Neuromuscular Re-ed     Dynamic Stability                              Plyoback                    Manual/Modalities        Manual PROM flexion and abduction of R shoulder, ER/IR PROM, Trigger point release of R subscap,  10'                    Therapeutic Exercise and NMR EXR  [x] (72745) Provided verbal/tactile cueing for activities related to strengthening, flexibility, endurance, ROM  for improvements in scapular, scapulothoracic and UE control with self care, reaching, carrying, lifting, house/yardwork, driving/computer work. [x] (36318) Provided verbal/tactile cueing for activities related to improving balance, coordination, kinesthetic sense, posture, motor skill, proprioception  to assist with  scapular, scapulothoracic and UE control with self care, reaching, carrying, lifting, house/yardwork, driving/computer work.     Therapeutic Activities:    [x] (95828 or 69111) Provided verbal/tactile cueing for activities related to improving balance, coordination, kinesthetic sense, posture, motor skill, proprioception and motor activation to allow for proper

## 2019-08-26 ENCOUNTER — TREATMENT (OUTPATIENT)
Dept: PHYSICAL THERAPY | Age: 54
End: 2019-08-26
Payer: COMMERCIAL

## 2019-08-26 DIAGNOSIS — M25.511 PAIN IN JOINT OF RIGHT SHOULDER: ICD-10-CM

## 2019-08-26 DIAGNOSIS — M75.01 ADHESIVE CAPSULITIS OF RIGHT SHOULDER: Primary | ICD-10-CM

## 2019-08-26 DIAGNOSIS — M75.111 PARTIAL NONTRAUMATIC TEAR OF ROTATOR CUFF, RIGHT: ICD-10-CM

## 2019-08-26 DIAGNOSIS — M25.611 DECREASED ROM OF RIGHT SHOULDER: ICD-10-CM

## 2019-08-26 PROCEDURE — 97530 THERAPEUTIC ACTIVITIES: CPT | Performed by: PHYSICAL THERAPIST

## 2019-08-26 PROCEDURE — 97110 THERAPEUTIC EXERCISES: CPT | Performed by: PHYSICAL THERAPIST

## 2019-08-26 PROCEDURE — 97140 MANUAL THERAPY 1/> REGIONS: CPT | Performed by: PHYSICAL THERAPIST

## 2019-08-26 NOTE — FLOWSHEET NOTE
care [] Alter current plan (see comments)  [] Plan of care initiated [] Hold pending MD visit [] Discharge    Electronically signed by:      Taz Núñez DPT, LUTHER-C      [place] PT license: [id number]  [place] PT license: [id number]

## 2019-09-03 ENCOUNTER — TREATMENT (OUTPATIENT)
Dept: PHYSICAL THERAPY | Age: 54
End: 2019-09-03
Payer: COMMERCIAL

## 2019-09-03 DIAGNOSIS — M25.611 DECREASED ROM OF RIGHT SHOULDER: ICD-10-CM

## 2019-09-03 DIAGNOSIS — M75.111 PARTIAL NONTRAUMATIC TEAR OF ROTATOR CUFF, RIGHT: ICD-10-CM

## 2019-09-03 DIAGNOSIS — M75.01 ADHESIVE CAPSULITIS OF RIGHT SHOULDER: Primary | ICD-10-CM

## 2019-09-03 DIAGNOSIS — M25.511 PAIN IN JOINT OF RIGHT SHOULDER: ICD-10-CM

## 2019-09-03 PROCEDURE — 97110 THERAPEUTIC EXERCISES: CPT | Performed by: PHYSICAL THERAPIST

## 2019-09-03 PROCEDURE — 97140 MANUAL THERAPY 1/> REGIONS: CPT | Performed by: PHYSICAL THERAPIST

## 2019-09-03 PROCEDURE — 97530 THERAPEUTIC ACTIVITIES: CPT | Performed by: PHYSICAL THERAPIST

## 2019-09-12 ENCOUNTER — OFFICE VISIT (OUTPATIENT)
Dept: ORTHOPEDIC SURGERY | Age: 54
End: 2019-09-12
Payer: COMMERCIAL

## 2019-09-12 VITALS
HEART RATE: 88 BPM | SYSTOLIC BLOOD PRESSURE: 100 MMHG | BODY MASS INDEX: 20.16 KG/M2 | DIASTOLIC BLOOD PRESSURE: 60 MMHG | WEIGHT: 121 LBS | HEIGHT: 65 IN

## 2019-09-12 DIAGNOSIS — Z98.890 STATUS POST RIGHT ROTATOR CUFF REPAIR: Primary | ICD-10-CM

## 2019-09-12 PROCEDURE — 99214 OFFICE O/P EST MOD 30 MIN: CPT | Performed by: ORTHOPAEDIC SURGERY

## 2019-09-12 NOTE — PROGRESS NOTES
Chief Complaint    Follow-up (right shoulder. pt states that she continues to get burning and aching in her shoulder )      History of Present Illness:  Randi Corbin is a pleasant, 53 y.o., female, here today for follow up of right shoulder pain. She underwent a right shoulder diagnostic arthroscopy, debridement, decompression and capsular release rotator cuff repair with augmentation on 2/11/2019. She is 7 months postop. She has had a significant improvement since we have seen her in our office but she continues to have tightness especially with her external rotation. She is working with 3 lbs of strength training. She does still experience pain with end range motion, especially posteriorly. She reports no new injuries or setbacks. Medical History:  Patient's medications, allergies, past medical, surgical, social and family histories were reviewed and updated as appropriate. Review of Systems   Musculoskeletal:        Joint pain          Review of Systems  A 14 point review of systems was completed by the patient on 6/28/19 and is available in the media section of the scanned medical record and was reviewed on 9/12/2019. The review is negative with the exception of those things mentioned in the HPI and Past Medical History    Vital Signs:  Vitals:    09/12/19 0919   BP: 100/60   Pulse: 88       General/Appearance: Alert and oriented and in no apparent distress. Skin:  There are no skin lesions, cellulitis, or extreme edema. The patient has warm and well-perfused Bilateral upper extremities with brisk capillary refill. RIGHT Shoulder Exam:  Inspection: Incision portals healing well. No gross deformities, no signs of infection. Palpation: No crepitus with motion    Active Range of Motion:   Forward Elevation 140, External Rotation 20, IR of T6, in supine with her arm at 90 of abduction she has 60 degrees of ER     Passive Range of Motion: FE of 145    Strength: -4/5 external rotation

## 2019-09-20 ENCOUNTER — TREATMENT (OUTPATIENT)
Dept: PHYSICAL THERAPY | Age: 54
End: 2019-09-20
Payer: COMMERCIAL

## 2019-09-20 DIAGNOSIS — M75.01 ADHESIVE CAPSULITIS OF RIGHT SHOULDER: Primary | ICD-10-CM

## 2019-09-20 DIAGNOSIS — M75.111 PARTIAL NONTRAUMATIC TEAR OF ROTATOR CUFF, RIGHT: ICD-10-CM

## 2019-09-20 DIAGNOSIS — M25.611 DECREASED ROM OF RIGHT SHOULDER: ICD-10-CM

## 2019-09-20 DIAGNOSIS — M25.511 PAIN IN JOINT OF RIGHT SHOULDER: ICD-10-CM

## 2019-09-20 PROCEDURE — 97530 THERAPEUTIC ACTIVITIES: CPT | Performed by: PHYSICAL THERAPIST

## 2019-09-20 PROCEDURE — 97140 MANUAL THERAPY 1/> REGIONS: CPT | Performed by: PHYSICAL THERAPIST

## 2019-09-20 PROCEDURE — 97110 THERAPEUTIC EXERCISES: CPT | Performed by: PHYSICAL THERAPIST

## 2019-09-20 NOTE — FLOWSHEET NOTE
[facility]   Phone: [phone]    Fax: [phone]       Physical Therapy Daily Treatment Note  Date:  2019    Patient Name:  Rohit Denny    :  1965  MRN: [id number]  Medical/Treatment Diagnosis Information:  · Diagnosis: R partial RTC tear, bursitis, and adhesive capsulitis  · DOS: 64  Insurance/Certification information:  PT Insurance Information: Medical [place]  Physician Information:  Referring Practitioner: Dr. Sandip Porter of care signed (Y/N): yes 9/3/19    Date of Patient follow up with Physician: 19    Date Applied:  9/3/19   Functional Assessment Tool Used: UEFI  Score: 56/80, 30% limitation  Functional Limitation: Carrying, moving and handling objects      Progress Note: []  Yes  [x]  No  Next due by: 10/3/19      Latex Allergy:  [x]NO      []YES  Preferred Language for Healthcare:   [x]English       []other:    Visit # Insurance Allowable   33 35     Pain level:  -7/10     SUBJECTIVE:   Patient report that she saw Dr. Mike Johnston last week and he was very pleased with how she is progressing. She does note that he has ordered a stat-a-dyne brace for ER for her. The rep is to meet patient at today's session for fitting. Patient reports that she is very sore today. >7 months postop    OBJECTIVE: See eval             ROM PROM AROM  Comment:  9/3/18     L R L R     Flexion    160° 155° 152°     Abduction     160° 143°*     ER    62° T4 T2*     IR     T3 T8     Other             Other                    Strength L R Comment:  9/3/19   Flexion 4+/5 4-/5    Abduction 4+/5 4-/5    ER 4+/5 4-/5    IR 4+/5 4-/5          Special Tests Results/Comment: deferred secondary to surgery:  9/3/19          RESTRICTIONS/PRECAUTIONS: R RTC repair, SAD, MANISH on 19;      Exercises:  Exercise/Equipment Resistance/Repetitions Comments   Cardio/Warm-up     Bike          Stretching/PROM     Wand 5x10\" ER at 0° abd to 45°   Supine cane ER 10x10\" @ 45° abd proprioception and motor activation to allow for proper function of scapular, scapulothoracic and UE control with self care, carrying, lifting, driving/computer work. Home Exercise Program:    [x] (27024) Reviewed/Progressed HEP activities related to strengthening, flexibility, endurance, ROM of scapular, scapulothoracic and UE control with self care, reaching, carrying, lifting, house/yardwork, driving/computer work  [] (14425) Reviewed/Progressed HEP activities related to improving balance, coordination, kinesthetic sense, posture, motor skill, proprioception of scapular, scapulothoracic and UE control with self care, reaching, carrying, lifting, house/yardwork, driving/computer work      Manual Treatments:  PROM / STM / Oscillations-Mobs:  G-I, II, III, IV (PA's, Inf., Post.)  [x] (29891) Provided manual therapy to mobilize soft tissue/joints of cervical/CT, scapular GHJ and UE for the purpose of modulating pain, promoting relaxation,  increasing ROM, reducing/eliminating soft tissue swelling/inflammation/restriction, improving soft tissue extensibility and allowing for proper ROM for normal function with self care, reaching, carrying, lifting, house/yardwork, driving/computer work    Modalities:  CP to R shoulder x10'     Charges:  Timed Code Treatment Minutes: 53   Total Treatment Minutes: 92     [] EVAL (LOW) 45773 (typically 20 minutes face-to-face)  [] EVAL (MOD) 69171 (typically 30 minutes face-to-face)  [] EVAL (HIGH) 75301 (typically 45 minutes face-to-face)  [] RE-EVAL   [x] SH(37977) x  1   [] IONTO    [] NMR (46307) x      [] VASO  [x] Manual (20187) x  1    [] Other:  [x] TA (43054) x  2    [] Mech Traction (79697)  [] ES(attended) (83894)      [] ES (un) (75199):     GOALS:  Patient stated goal: Able to play golf without increased pain in the R shoulder.     Therapist goals for Patient:   Short Term Goals: To be achieved in: 2 weeks  1.  Independent in HEP and progression per patient tolerance, in order to prevent re-injury. MET  2. Patient will have a decrease in pain to facilitate improvement in movement, function, and ADLs as indicated by Functional Deficits. ONGOING     Long Term Goals: To be achieved in: 6 weeks  1. Disability index score of 10% or less for the UEFI to assist with reaching prior level of function. ONGOING  2. Patient will demonstrate increased R shoulder flexion, abduction, ER, and IR AROM to >160°, >160°, T3, and >T8 respectively, to allow for proper joint functioning as indicated by patients Functional Deficits. ONGOING  3. Patient will demonstrate an increase in R shoulder strength in all planes by a half grade or greater to allow for proper functional mobility as indicated by patients Functional Deficits. ONGOING  4. Patient will return to all functional activities without increased symptoms or restriction. ONGOING  5. Patient will be able to complete bathing and dressing activities with R UE without increased pain in the R shoulder so that she may return to PLOF for all ADLs. ONGOING  6. Patient will be able to play 9 holes of golf without increased pain in the R shoulder so that she may return to PLOF. ONGOING     Progression Towards Functional goals:  [x] Patient is progressing as expected towards functional goals listed. [] Progression is slowed due to complexities listed. [] Progression has been slowed due to co-morbidities. [] Plan just implemented, too soon to assess goals progression  [] Other:     ASSESSMENT: Patient demonstrates significant fatigue with increased weight for bicep curl and wrist 4 way as well as introduction of TB ER and IR today. She requires moderate verbal and visual cues from PT for proper form with TB activities today. PT provided patient with printout to include TB ER and IR in her HEP.  Patient was fitted for stat-a-dyne brace for ER today but rep during session and was instructed to complete three 30 minute sessions a day in conjunction with her

## 2019-10-02 ENCOUNTER — TREATMENT (OUTPATIENT)
Dept: PHYSICAL THERAPY | Age: 54
End: 2019-10-02
Payer: COMMERCIAL

## 2019-10-02 DIAGNOSIS — M75.111 PARTIAL NONTRAUMATIC TEAR OF ROTATOR CUFF, RIGHT: ICD-10-CM

## 2019-10-02 DIAGNOSIS — M25.611 DECREASED ROM OF RIGHT SHOULDER: ICD-10-CM

## 2019-10-02 DIAGNOSIS — M75.01 ADHESIVE CAPSULITIS OF RIGHT SHOULDER: Primary | ICD-10-CM

## 2019-10-02 DIAGNOSIS — M25.511 PAIN IN JOINT OF RIGHT SHOULDER: ICD-10-CM

## 2019-10-02 PROCEDURE — 97110 THERAPEUTIC EXERCISES: CPT | Performed by: PHYSICAL THERAPIST

## 2019-10-02 PROCEDURE — 97140 MANUAL THERAPY 1/> REGIONS: CPT | Performed by: PHYSICAL THERAPIST

## 2019-10-02 PROCEDURE — 97530 THERAPEUTIC ACTIVITIES: CPT | Performed by: PHYSICAL THERAPIST

## 2019-10-10 ENCOUNTER — OFFICE VISIT (OUTPATIENT)
Dept: ORTHOPEDIC SURGERY | Age: 54
End: 2019-10-10
Payer: COMMERCIAL

## 2019-10-10 VITALS
HEIGHT: 65 IN | WEIGHT: 121.03 LBS | SYSTOLIC BLOOD PRESSURE: 106 MMHG | HEART RATE: 58 BPM | DIASTOLIC BLOOD PRESSURE: 60 MMHG | BODY MASS INDEX: 20.17 KG/M2

## 2019-10-10 DIAGNOSIS — Z98.890 STATUS POST RIGHT ROTATOR CUFF REPAIR: Primary | ICD-10-CM

## 2019-10-10 DIAGNOSIS — M25.611 SHOULDER STIFFNESS, RIGHT: ICD-10-CM

## 2019-10-10 PROCEDURE — 99213 OFFICE O/P EST LOW 20 MIN: CPT | Performed by: ORTHOPAEDIC SURGERY

## 2019-10-15 ENCOUNTER — TREATMENT (OUTPATIENT)
Dept: PHYSICAL THERAPY | Age: 54
End: 2019-10-15
Payer: COMMERCIAL

## 2019-10-15 DIAGNOSIS — M75.111 PARTIAL NONTRAUMATIC TEAR OF ROTATOR CUFF, RIGHT: ICD-10-CM

## 2019-10-15 DIAGNOSIS — M75.01 ADHESIVE CAPSULITIS OF RIGHT SHOULDER: Primary | ICD-10-CM

## 2019-10-15 DIAGNOSIS — M25.511 PAIN IN JOINT OF RIGHT SHOULDER: ICD-10-CM

## 2019-10-15 DIAGNOSIS — M25.611 DECREASED ROM OF RIGHT SHOULDER: ICD-10-CM

## 2019-10-15 PROCEDURE — 97140 MANUAL THERAPY 1/> REGIONS: CPT | Performed by: PHYSICAL THERAPIST

## 2019-10-15 PROCEDURE — 97530 THERAPEUTIC ACTIVITIES: CPT | Performed by: PHYSICAL THERAPIST

## 2019-10-15 PROCEDURE — 97110 THERAPEUTIC EXERCISES: CPT | Performed by: PHYSICAL THERAPIST

## 2019-10-30 ENCOUNTER — TREATMENT (OUTPATIENT)
Dept: PHYSICAL THERAPY | Age: 54
End: 2019-10-30

## 2019-10-30 DIAGNOSIS — M25.511 PAIN IN JOINT OF RIGHT SHOULDER: ICD-10-CM

## 2019-10-30 DIAGNOSIS — M25.611 DECREASED ROM OF RIGHT SHOULDER: ICD-10-CM

## 2019-10-30 DIAGNOSIS — M75.01 ADHESIVE CAPSULITIS OF RIGHT SHOULDER: Primary | ICD-10-CM

## 2019-10-30 DIAGNOSIS — M75.111 PARTIAL NONTRAUMATIC TEAR OF ROTATOR CUFF, RIGHT: ICD-10-CM

## 2019-10-30 PROCEDURE — 97140 MANUAL THERAPY 1/> REGIONS: CPT | Performed by: PHYSICAL THERAPIST

## 2019-10-30 PROCEDURE — 97530 THERAPEUTIC ACTIVITIES: CPT | Performed by: PHYSICAL THERAPIST

## 2019-11-13 ENCOUNTER — TREATMENT (OUTPATIENT)
Dept: PHYSICAL THERAPY | Age: 54
End: 2019-11-13

## 2019-11-13 DIAGNOSIS — M75.111 PARTIAL NONTRAUMATIC TEAR OF ROTATOR CUFF, RIGHT: ICD-10-CM

## 2019-11-13 DIAGNOSIS — M75.01 ADHESIVE CAPSULITIS OF RIGHT SHOULDER: Primary | ICD-10-CM

## 2019-11-13 DIAGNOSIS — M25.611 DECREASED ROM OF RIGHT SHOULDER: ICD-10-CM

## 2019-11-13 DIAGNOSIS — M25.511 PAIN IN JOINT OF RIGHT SHOULDER: ICD-10-CM

## 2019-11-13 PROCEDURE — 97530 THERAPEUTIC ACTIVITIES: CPT | Performed by: PHYSICAL THERAPIST

## 2019-11-13 PROCEDURE — 97140 MANUAL THERAPY 1/> REGIONS: CPT | Performed by: PHYSICAL THERAPIST

## 2019-11-14 ENCOUNTER — OFFICE VISIT (OUTPATIENT)
Dept: ORTHOPEDIC SURGERY | Age: 54
End: 2019-11-14
Payer: COMMERCIAL

## 2019-11-14 VITALS
SYSTOLIC BLOOD PRESSURE: 99 MMHG | BODY MASS INDEX: 20.17 KG/M2 | WEIGHT: 121.03 LBS | HEIGHT: 65 IN | DIASTOLIC BLOOD PRESSURE: 61 MMHG | HEART RATE: 70 BPM

## 2019-11-14 DIAGNOSIS — M75.01 ADHESIVE CAPSULITIS OF RIGHT SHOULDER: ICD-10-CM

## 2019-11-14 DIAGNOSIS — M25.611 SHOULDER STIFFNESS, RIGHT: ICD-10-CM

## 2019-11-14 DIAGNOSIS — M19.011 ARTHRITIS OF RIGHT ACROMIOCLAVICULAR JOINT: ICD-10-CM

## 2019-11-14 DIAGNOSIS — Z98.890 STATUS POST RIGHT ROTATOR CUFF REPAIR: Primary | ICD-10-CM

## 2019-11-14 PROCEDURE — 99213 OFFICE O/P EST LOW 20 MIN: CPT | Performed by: ORTHOPAEDIC SURGERY

## 2019-11-14 RX ORDER — MELOXICAM 15 MG/1
15 TABLET ORAL DAILY
Qty: 30 TABLET | Refills: 1 | Status: SHIPPED | OUTPATIENT
Start: 2019-11-14 | End: 2019-12-14

## 2019-11-26 ENCOUNTER — TREATMENT (OUTPATIENT)
Dept: PHYSICAL THERAPY | Age: 54
End: 2019-11-26

## 2019-11-26 DIAGNOSIS — M25.611 DECREASED ROM OF RIGHT SHOULDER: ICD-10-CM

## 2019-11-26 DIAGNOSIS — M75.111 PARTIAL NONTRAUMATIC TEAR OF ROTATOR CUFF, RIGHT: ICD-10-CM

## 2019-11-26 DIAGNOSIS — M25.511 PAIN IN JOINT OF RIGHT SHOULDER: ICD-10-CM

## 2019-11-26 DIAGNOSIS — M75.01 ADHESIVE CAPSULITIS OF RIGHT SHOULDER: Primary | ICD-10-CM

## 2019-12-03 ENCOUNTER — TREATMENT (OUTPATIENT)
Dept: PHYSICAL THERAPY | Age: 54
End: 2019-12-03

## 2019-12-03 DIAGNOSIS — M25.511 PAIN IN JOINT OF RIGHT SHOULDER: ICD-10-CM

## 2019-12-03 DIAGNOSIS — M25.611 DECREASED ROM OF RIGHT SHOULDER: ICD-10-CM

## 2019-12-03 DIAGNOSIS — M75.01 ADHESIVE CAPSULITIS OF RIGHT SHOULDER: Primary | ICD-10-CM

## 2019-12-03 DIAGNOSIS — M75.111 PARTIAL NONTRAUMATIC TEAR OF ROTATOR CUFF, RIGHT: ICD-10-CM

## 2019-12-03 PROCEDURE — 97140 MANUAL THERAPY 1/> REGIONS: CPT | Performed by: PHYSICAL THERAPIST

## 2019-12-03 PROCEDURE — 97530 THERAPEUTIC ACTIVITIES: CPT | Performed by: PHYSICAL THERAPIST

## 2019-12-17 ENCOUNTER — TREATMENT (OUTPATIENT)
Dept: PHYSICAL THERAPY | Age: 54
End: 2019-12-17

## 2019-12-17 DIAGNOSIS — M25.611 DECREASED ROM OF RIGHT SHOULDER: ICD-10-CM

## 2019-12-17 DIAGNOSIS — M75.111 PARTIAL NONTRAUMATIC TEAR OF ROTATOR CUFF, RIGHT: ICD-10-CM

## 2019-12-17 DIAGNOSIS — M75.01 ADHESIVE CAPSULITIS OF RIGHT SHOULDER: Primary | ICD-10-CM

## 2019-12-17 DIAGNOSIS — M25.511 PAIN IN JOINT OF RIGHT SHOULDER: ICD-10-CM

## 2019-12-17 PROCEDURE — 97530 THERAPEUTIC ACTIVITIES: CPT | Performed by: PHYSICAL THERAPIST

## 2019-12-17 PROCEDURE — 97140 MANUAL THERAPY 1/> REGIONS: CPT | Performed by: PHYSICAL THERAPIST

## 2020-01-07 ENCOUNTER — OFFICE VISIT (OUTPATIENT)
Dept: ORTHOPEDIC SURGERY | Age: 55
End: 2020-01-07
Payer: COMMERCIAL

## 2020-01-07 VITALS
HEART RATE: 62 BPM | SYSTOLIC BLOOD PRESSURE: 112 MMHG | HEIGHT: 65 IN | DIASTOLIC BLOOD PRESSURE: 60 MMHG | BODY MASS INDEX: 20.17 KG/M2 | WEIGHT: 121.03 LBS

## 2020-01-07 PROCEDURE — 99213 OFFICE O/P EST LOW 20 MIN: CPT | Performed by: ORTHOPAEDIC SURGERY

## 2020-01-07 NOTE — PROGRESS NOTES
[facility]  History and Physical  Shoulder Pain    Date:  2020    Name:  Mari Johnston  Address:  [address] [address]    :  1965      Age:   53 y.o.    SSN:  [id number]      Medical Record Number:  <[id number]>    Reason for Visit:    Follow-up (Right Shoulder)      HPI:   Mari Johnston is a 53 y.o. female who presents to our office today for follow up of the right shoulder pain. She had a right shoulder diagnostic arthroscopy, debridement, decompression and capsular release rotator cuff repair with augmentation on 2019. She reports she does feel that she has made progress since she was last seen however it is not where she wants to be still. She reports that her motion is good and most of the areas except for with external rotation. She still feels like her movements are natural.  She still feels like she is still struggling to reach for certain things. She denies pain and reports she is sleeping better at nighttime. She does note that she has returned back to most normal activities. She has been going to physical therapy twice a week. Pain Assessment  Location of Pain: Shoulder  Location Modifiers: Right  Severity of Pain: 2  Quality of Pain: Aching, Dull  Duration of Pain: Persistent  Frequency of Pain: Constant  Aggravating Factors: Exercise, Stretching  Limiting Behavior: Some  Relieving Factors: Rest, Ice  Result of Injury: No  Work-Related Injury: No  Are there other pain locations you wish to document?: No    No notes on file    Review of Systems:  A 14 point review of systems available in the scanned medical record as documented by the patient. The review is negative with the exception of those things mentioned in the History of Present Illness and Past Medical History.       Past Medical History:  Patient's medications, allergies, past medical, surgical, social and family histories were reviewed and updated as capsulitis of right shoulder        Office Procedures:  No orders of the defined types were placed in this encounter. Plan: At this point the patient reports that she has had 90% improvement in her overall right shoulder. She does have some mild stiffness with external rotation in comparison to her her left side. Otherwise we feel that she is doing quite well overall. We do recommend more physical therapy as she is continuing to gain something from that. She may work on [employer] and the strength program at this point. She can also go once a week as noted in her updated therapy orders. All her questions were fully answered today. We will see her back in 5 weeks for follow-up visit or she can call and cancel if she is satisfied with her shoulder performance. 1/7/2020  10:19 AM      Shelby Bishop PA-C  Orthopaedic Sports Medicine Physician Assistant    During this examination, I, Shelby Bishop PA-C, functioned as a scribe for Dr. Jolie Mcgee. This dictation was performed with a verbal recognition program (DRAGON) and it was checked for errors. It is possible that there are still dictated errors within this office note. If so, please bring any errors to my attention for an addendum. All efforts were made to ensure that this office note is accurate.  ______________________  I, Dr. Jolie Mcgee, personally performed the services described in this documentation as described by Shelby Bishop PA-C in my presence, and it is both accurate and complete. Aldo Boss MD, PhD  1/7/2020

## 2020-01-07 NOTE — LETTER
Physical Therapy Rehabilitation Referral    Patient Name:  Amrik Villegas      YOB: 1965    Diagnosis:    1. Status post right rotator cuff repair    2. Shoulder stiffness, right    3. Adhesive capsulitis of right shoulder        Precautions:     [x] Evaluate and Treat    Post Op Instructions:  [] Continuous passive motion (CPM)  [] Elbow ROM  [x] Exercise in plane of scapula  []  Strengthening     [] Pulley and instruction   [x] Home exercise program (copy to patient)   [] Sling when arm at risk  [] Sling or brace at all times   [x] AAROM: Forward elevation to  140            [x] AAROM: External rotation  To  40    [] Isometric external rotator strengthening [x] AAROM: internal rotation: up the back  [x] Isometric abductor strengthening  [x] AAROM: Internal abduction   [] Isometric internal rotator strengthening [x] AAROM: cross-body adduction             Stretching:     Strengthening:  [x] Four quadrant (FE, ER, IR, CBA)  [x] Rotator cuff (ER, IR, Abd)  [x] Forward Elevation    [] External Rotators     [x] External Rotation    [] Internal Rotators  [x] Internal Rotation: up/back   [] Abductors     [x] Internal Rotation: supine in abduction  [x] Sleeper Stretch    [] Flexors  [x] Cross-body abduction    [] Extensors  [x] Pendulum (FE, Abd/Add, cw/ccw)  [x] Scapular Stabilizers   [x] Wall-walking (FE, Abd)        [x] Shoulder shrugs     [x] Table slides (FE)                [x] Rhomboid pinch  [] Elbow (flex, ext, pron, sup)        [] Lat.  Pull downs     [] Medial epicondylitis program       [] Forward punch   [] Lateral epicondylitis program       [] Internal rotators     [] Progressive resistive exercises  [] Bench Press        [] Bench press plus  Activities:     [] Lateral pull-downs  [] Rowing     [] Progressive two-hand supine press  [] Stepper/Exercise bike   [x] Biceps: curls/supination  [] Swimming  [] Water exercises    Modalities:     Return to Sport:

## 2020-01-09 ENCOUNTER — TREATMENT (OUTPATIENT)
Dept: PHYSICAL THERAPY | Age: 55
End: 2020-01-09
Payer: COMMERCIAL

## 2020-01-09 PROCEDURE — 97140 MANUAL THERAPY 1/> REGIONS: CPT | Performed by: PHYSICAL THERAPIST

## 2020-01-09 PROCEDURE — 97110 THERAPEUTIC EXERCISES: CPT | Performed by: PHYSICAL THERAPIST

## 2020-01-09 PROCEDURE — 97530 THERAPEUTIC ACTIVITIES: CPT | Performed by: PHYSICAL THERAPIST

## 2020-01-09 NOTE — FLOWSHEET NOTE
[facility]   Phone: [phone]    Fax: [phone]     Physical Therapy Daily Treatment Note  Date:  2020    Patient Name:  Disha Lemus    :  1965  MRN: <[id number]>  Medical/Treatment Diagnosis Information:  · Diagnosis: R partial RTC tear, bursitis, and adhesive capsulitis  · DOS:   Insurance/Certification information:  PT Insurance Information: Medical [place]  Physician Information:  Referring Practitioner: Dr. Deonte Ken of care signed (Y/N): yes 19    Date of Patient follow up with Physician: PRN    Date Applied:  9/3/19   Functional Assessment Tool Used: UEFI  Score: 67/80, 16.25% limitation  Functional Limitation: Carrying, moving and handling objects      Progress Note: []  Yes  [x]  No  Next due by: 2020     Latex Allergy:  [x]NO      []YES  Preferred Language for Healthcare:   [x]English       []other:    Visit # Insurance Allowable   1  (39 in ) TBD     Patient is self pay as of 10/30/19       Pain level:  2-3/10     SUBJECTIVE:   Patient reports that she feels her motion continues to improve. She notes that her chief complaint is tightness under the R arm and along the R triceps. >10 months postop    OBJECTIVE: See eval             ROM PROM AROM  Comment:  19     L R L R     Flexion   155° 164°     Abduction   160° 171°     ER   T4 T3     IR   T3 T5     Other             Other                    Strength L R Comment:  19   Flexion 4+/5 5/5    Abduction 4+/5 4+/5    ER 4+/5 4+/5    IR 4+/5 4+/5          Special Tests Results/Comment: deferred secondary to surgery:  19          RESTRICTIONS/PRECAUTIONS: R RTC repair, SAD, MANISH on 19;      Exercises:  Exercise/Equipment Resistance/Repetitions Comments   Cardio/Warm-up     Bike          Stretching/PROM     Wand 5x10\" ER at 0° abd to 45°   Supine cane ER 2#, 10x10\" @ 45° abd   Supine cane flexion 2#, 8x10\"    Table Slides 5x10\" Flex and scaption Wall slides 5x10\"    flex   Table walk back 5x10\"    Pulleys 5x10\"    F/b, s/s      Wrist 4 way 20x5#    UT stretch 10x10\"    Doorway ER  At 0° abd      BBIR 5x10\"    Biceps stretch 5x10\"    Supine ER LLLD 5x10\" Hands behind head        STRENGTH     Isometrics     Retraction 20x5\"    Hold       Finger extension    Weight shift     Flexion     Abduction     External Rotation     Internal Rotation     hold   Triceps          PRE's     Alt Flex and scap 10xea    Abduction     External Rotation     Internal Rotation     Shrugs     EXT     Reverse Flys     Serratus     Horizontal Abd with ER     Biceps 5#, 2x10    Triceps     Retraction          Cable Column/Theraband     External Rotation    Internal Rotation    Shrugs    Lats    Ext Yellow 2x10    Flex     Scapular Retraction Yellow, 2x10    BIC    TRIC Silver, 2x10   PNF          Neuromuscular Re-ed     Dynamic Stability          Ball on wall  Cw/ccw; HOLD                  Plyoback                    Manual/Modalities         , Trigger point release of R subscap, STM to R triceps  10'                    Therapeutic Exercise and NMR EXR  [x] (60425) Provided verbal/tactile cueing for activities related to strengthening, flexibility, endurance, ROM  for improvements in scapular, scapulothoracic and UE control with self care, reaching, carrying, lifting, house/yardwork, driving/computer work. [x] (36987) Provided verbal/tactile cueing for activities related to improving balance, coordination, kinesthetic sense, posture, motor skill, proprioception  to assist with  scapular, scapulothoracic and UE control with self care, reaching, carrying, lifting, house/yardwork, driving/computer work.     Therapeutic Activities:    [x] (30184 or 30337) Provided verbal/tactile cueing for activities related to improving balance, coordination, kinesthetic sense, posture, motor skill, proprioception and motor activation to allow for proper function of scapular, scapulothoracic and UE control with self care, carrying, lifting, driving/computer work. Home Exercise Program:    [x] (84062) Reviewed/Progressed HEP activities related to strengthening, flexibility, endurance, ROM of scapular, scapulothoracic and UE control with self care, reaching, carrying, lifting, house/yardwork, driving/computer work  [] (86317) Reviewed/Progressed HEP activities related to improving balance, coordination, kinesthetic sense, posture, motor skill, proprioception of scapular, scapulothoracic and UE control with self care, reaching, carrying, lifting, house/yardwork, driving/computer work      Manual Treatments:  PROM / STM / Oscillations-Mobs:  G-I, II, III, IV (PA's, Inf., Post.)  [x] (72286) Provided manual therapy to mobilize soft tissue/joints of cervical/CT, scapular GHJ and UE for the purpose of modulating pain, promoting relaxation,  increasing ROM, reducing/eliminating soft tissue swelling/inflammation/restriction, improving soft tissue extensibility and allowing for proper ROM for normal function with self care, reaching, carrying, lifting, house/yardwork, driving/computer work    Modalities:  CP to R shoulder x10'     Charges:  Timed Code Treatment Minutes: 45   Total Treatment Minutes: 65     [] EVAL (LOW) 51290 (typically 20 minutes face-to-face)  [] EVAL (MOD) 29050 (typically 30 minutes face-to-face)  [] EVAL (HIGH) 88113 (typically 45 minutes face-to-face)  [] RE-EVAL   [x] QK(76500) x  1   [] IONTO    [] NMR (63032) x      [] VASO   [x] Manual (22352) x  1    [] Other:  [x] TA (58063) x  1    [] [facility] Traction (23835)  [] ES(attended) (66281)      [] ES (un) (48342):     GOALS:  Patient stated goal: Able to play golf without increased pain in the R shoulder.     Therapist goals for Patient:   Short Term Goals: To be achieved in: 2 weeks  1. Independent in HEP and progression per patient tolerance, in order to prevent re-injury. MET  2.  Patient will have a decrease in pain to facilitate Good [] Fair  [] Poor    Patient Requires Follow-up: [x] Yes  [] No    PLAN: 1-2x/week for 4 weeks  [x] Continue per plan of care [] Alter current plan (see comments)  [] Plan of care initiated [] Hold pending MD visit [] Discharge    Electronically signed by:      Aurora Bundy DPT, OMT-C      [id number] [address] PT license: [id number]  [place] PT license: [id number]

## 2020-01-27 ENCOUNTER — TREATMENT (OUTPATIENT)
Dept: PHYSICAL THERAPY | Age: 55
End: 2020-01-27
Payer: COMMERCIAL

## 2020-01-27 PROCEDURE — 97140 MANUAL THERAPY 1/> REGIONS: CPT | Performed by: PHYSICAL THERAPIST

## 2020-01-27 PROCEDURE — 97530 THERAPEUTIC ACTIVITIES: CPT | Performed by: PHYSICAL THERAPIST

## 2020-01-27 PROCEDURE — 97110 THERAPEUTIC EXERCISES: CPT | Performed by: PHYSICAL THERAPIST

## 2020-01-27 NOTE — PLAN OF CARE
Yellow 2x10    Flex     Scapular Retraction Yellow, 2x10    BIC    TRIC Silver, 2x10   PNF          Neuromuscular Re-ed     Dynamic Stability          Ball on wall  Cw/ccw; HOLD                  Plyoback                    Manual/Modalities         , Trigger point release of R subscap,  10'                    Therapeutic Exercise and NMR EXR  [x] (14471) Provided verbal/tactile cueing for activities related to strengthening, flexibility, endurance, ROM  for improvements in scapular, scapulothoracic and UE control with self care, reaching, carrying, lifting, house/yardwork, driving/computer work. [x] (65636) Provided verbal/tactile cueing for activities related to improving balance, coordination, kinesthetic sense, posture, motor skill, proprioception  to assist with  scapular, scapulothoracic and UE control with self care, reaching, carrying, lifting, house/yardwork, driving/computer work. Therapeutic Activities:    [x] (90606 or 41780) Provided verbal/tactile cueing for activities related to improving balance, coordination, kinesthetic sense, posture, motor skill, proprioception and motor activation to allow for proper function of scapular, scapulothoracic and UE control with self care, carrying, lifting, driving/computer work.      Home Exercise Program:    [x] (42575) Reviewed/Progressed HEP activities related to strengthening, flexibility, endurance, ROM of scapular, scapulothoracic and UE control with self care, reaching, carrying, lifting, house/yardwork, driving/computer work  [] (21231) Reviewed/Progressed HEP activities related to improving balance, coordination, kinesthetic sense, posture, motor skill, proprioception of scapular, scapulothoracic and UE control with self care, reaching, carrying, lifting, house/yardwork, driving/computer work      Manual Treatments:  PROM / STM / Oscillations-Mobs:  G-I, II, III, IV (PA's, Inf., Post.)  [x] (49307) Provided manual therapy to mobilize soft tissue/joints ONGOING  5. Patient will be able to complete bathing and dressing activities with R UE without increased pain in the R shoulder so that she may return to PLOF for all ADLs. ONGOING  6. Patient will be able to play 9 holes of golf without increased pain in the R shoulder so that she may return to PLOF. ONGOING     Progression Towards Functional goals:  [x] Patient is progressing as expected towards functional goals listed. [] Progression is slowed due to complexities listed. [] Progression has been slowed due to co-morbidities. [] Plan just implemented, too soon to assess goals progression  [] Other:     ASSESSMENT: Patient continues to demonstrate small steady improvements in R shoulder ROM and strength. She continues to exhibit moderate muscle tightness in the R tricep and internal rotators, but this seems to be decreasing as she uses her massage wand at home. She demonstrates decreased fatigue with alternating flexion and scaption, but does still tire with the activity. Patient would benefit from continued skilled PT services to address remaining deficits in R shoulder AROM, strength and function so that she may return to PLOF for all ADLs.     Treatment/Activity Tolerance:  [x] Patient tolerated treatment well [] Patient limited by fatique  [] Patient limited by pain  [] Patient limited by other medical complications  [] Other:     Prognosis: [x] Good [] Fair  [] Poor    Patient Requires Follow-up: [x] Yes  [] No    PLAN: 1-2x/week for 4 weeks  [x] Continue per plan of care [] Alter current plan (see comments)  [] Plan of care initiated [] Hold pending MD visit [] Discharge    Electronically signed by:      Alina Carcamo DPT, LUTHER-C      [place] PT license: [id number]  [place] PT license: [id number]

## 2020-02-10 ENCOUNTER — TREATMENT (OUTPATIENT)
Dept: PHYSICAL THERAPY | Age: 55
End: 2020-02-10
Payer: COMMERCIAL

## 2020-02-10 PROCEDURE — 97530 THERAPEUTIC ACTIVITIES: CPT | Performed by: PHYSICAL THERAPIST

## 2020-02-10 PROCEDURE — 97140 MANUAL THERAPY 1/> REGIONS: CPT | Performed by: PHYSICAL THERAPIST

## 2020-02-10 PROCEDURE — 97110 THERAPEUTIC EXERCISES: CPT | Performed by: PHYSICAL THERAPIST

## 2020-02-10 NOTE — FLOWSHEET NOTE
Table Slides 5x10\" Flex and scaption   Wall slides 5x10\"    flex   Table walk back 5x10\"    Pulleys 5x10\"    F/b, s/s      Wrist 4 way 20x5#    UT stretch 10x10\"    Doorway ER  At 0° abd      BBIR 5x10\"    Biceps stretch 5x10\"    Supine ER LLLD 5x10\" Hands behind head        STRENGTH     Isometrics     Retraction 20x5\"    Hold       Finger extension    Weight shift     Flexion     Abduction     External Rotation     Internal Rotation     hold   Triceps          PRE's     Alt Flex and scap 10xea    Abduction     External Rotation     Internal Rotation     Shrugs     EXT     Reverse Flys     Serratus     Horizontal Abd with ER     Biceps 5#, 2x10    Triceps     Retraction          Cable Column/Theraband     External Rotation    Internal Rotation    Shrugs    Lats    Ext Yellow 2x10    Flex     Scapular Retraction Yellow, 2x10    BIC    TRIC Silver, 2x10   PNF          Neuromuscular Re-ed     Dynamic Stability          Ball on wall  Cw/ccw; HOLD                  Plyoback                    Manual/Modalities         , Trigger point release of R subscap,  10'                    Therapeutic Exercise and NMR EXR  [x] (42271) Provided verbal/tactile cueing for activities related to strengthening, flexibility, endurance, ROM  for improvements in scapular, scapulothoracic and UE control with self care, reaching, carrying, lifting, house/yardwork, driving/computer work. [x] (84710) Provided verbal/tactile cueing for activities related to improving balance, coordination, kinesthetic sense, posture, motor skill, proprioception  to assist with  scapular, scapulothoracic and UE control with self care, reaching, carrying, lifting, house/yardwork, driving/computer work.     Therapeutic Activities:    [x] (53179 or 97157) Provided verbal/tactile cueing for activities related to improving balance, coordination, kinesthetic sense, posture, motor skill, proprioception and motor activation to allow for proper function of scapular, scapulothoracic and UE control with self care, carrying, lifting, driving/computer work. Home Exercise Program:    [x] (39340) Reviewed/Progressed HEP activities related to strengthening, flexibility, endurance, ROM of scapular, scapulothoracic and UE control with self care, reaching, carrying, lifting, house/yardwork, driving/computer work  [] (28833) Reviewed/Progressed HEP activities related to improving balance, coordination, kinesthetic sense, posture, motor skill, proprioception of scapular, scapulothoracic and UE control with self care, reaching, carrying, lifting, house/yardwork, driving/computer work      Manual Treatments:  PROM / STM / Oscillations-Mobs:  G-I, II, III, IV (PA's, Inf., Post.)  [x] (34209) Provided manual therapy to mobilize soft tissue/joints of cervical/CT, scapular GHJ and UE for the purpose of modulating pain, promoting relaxation,  increasing ROM, reducing/eliminating soft tissue swelling/inflammation/restriction, improving soft tissue extensibility and allowing for proper ROM for normal function with self care, reaching, carrying, lifting, house/yardwork, driving/computer work    Modalities:  CP to R shoulder x10'     Charges:  Timed Code Treatment Minutes: 45   Total Treatment Minutes: 55     [] EVAL (LOW) 97688 (typically 20 minutes face-to-face)  [] EVAL (MOD) 62940 (typically 30 minutes face-to-face)  [] EVAL (HIGH) 05108 (typically 45 minutes face-to-face)  [] RE-EVAL   [x] UY(74418) x  1   [] IONTO    [] NMR (79511) x      [] VASO   [x] Manual (91504) x  1    [] Other:  [x] TA (47480) x  1    [] [facility] (69147[facility]  [] ES(attended) (24233)      [] ES (un) (35071):     GOALS:  Patient stated goal: Able to play golf without increased pain in the R shoulder.     Therapist goals for Patient:   Short Term Goals: To be achieved in: 2 weeks  1. Independent in HEP and progression per patient tolerance, in order to prevent re-injury. MET  2.  Patient will have a decrease in pain to facilitate improvement in movement, function, and ADLs as indicated by Functional Deficits. ONGOING     Long Term Goals: To be achieved in: 6 weeks  1. Disability index score of 10% or less for the UEFI to assist with reaching prior level of function. ONGOING  2. Patient will demonstrate increased R shoulder flexion, abduction, ER, and IR AROM to >160°, >160°, T3, and >T8 respectively, to allow for proper joint functioning as indicated by patients Functional Deficits. ONGOING  3. Patient will demonstrate an increase in R shoulder strength in all planes by a half grade or greater to allow for proper functional mobility as indicated by patients Functional Deficits. ONGOING  4. Patient will return to all functional activities without increased symptoms or restriction. ONGOING  5. Patient will be able to complete bathing and dressing activities with R UE without increased pain in the R shoulder so that she may return to PLOF for all ADLs. ONGOING  6. Patient will be able to play 9 holes of golf without increased pain in the R shoulder so that she may return to PLOF. ONGOING     Progression Towards Functional goals:  [x] Patient is progressing as expected towards functional goals listed. [] Progression is slowed due to complexities listed. [] Progression has been slowed due to co-morbidities. [] Plan just implemented, too soon to assess goals progression  [] Other:     ASSESSMENT: Patient continues to demonstrate slow gains in R shoulder ER ROM both actively and passively. She demonstrates palpable decrease in the R teres and axillary area during manual STM and stretching by PT today. PT provided patient with return to golf program to begin while in [place] later this week. Plan to reassess patient's progress when she returns from [place].     Treatment/Activity Tolerance:  [x] Patient tolerated treatment well [] Patient limited by fatique  [] Patient limited by pain  [] Patient limited by other medical complications  [] Other:     Prognosis: [x] Good [] Fair  [] Poor    Patient Requires Follow-up: [x] Yes  [] No    PLAN: 1-2x/week for 4 weeks  [x] Continue per plan of care [] Alter current plan (see comments)  [] Plan of care initiated [] Hold pending MD visit [] Discharge    Electronically signed by:      Mauro Smallwood DPT, CHERRYC      [place] PT license: [id number]  [place] PT license: [id number]

## (undated) DEVICE — INTENDED TO SUPPORT AND MAINTAIN THE POSITION OF AN ANESTHETIZED PATIENT DURING SURGERY: Brand: ERIN BEACH CHAIR FACE MASK

## (undated) DEVICE — COVER LT HNDL BLU PLAS

## (undated) DEVICE — GLOVE SURG SZ 8 L12IN FNGR THK79MIL GRN LTX FREE

## (undated) DEVICE — SURE SET-DOUBLE BASIN-LF: Brand: MEDLINE INDUSTRIES, INC.

## (undated) DEVICE — NEEDLE SPNL L3.5IN PNK HUB S STL REG WALL FIT STYL W/ QNCKE

## (undated) DEVICE — DRAPE 70X60IN SPLIT IMPERV ADHES STRIP

## (undated) DEVICE — PAD,ABDOMINAL,5"X9",ST,LF,25/BX: Brand: MEDLINE INDUSTRIES, INC.

## (undated) DEVICE — TURNOVER KIT RM INF CTRL TECH

## (undated) DEVICE — ELECTRODE PT RET AD L9FT HI MOIST COND ADH HYDRGEL CORDED

## (undated) DEVICE — TUBING PMP L6FT CONT WAVE EXTN

## (undated) DEVICE — 3M™ WARMING BLANKET, LOWER BODY, 10 PER CASE, 42568: Brand: BAIR HUGGER™

## (undated) DEVICE — GAUZE,SPONGE,4"X4",16PLY,XRAY,STRL,LF: Brand: MEDLINE

## (undated) DEVICE — SYSTEM SKIN CLSR 22CM DERMBND PRINEO

## (undated) DEVICE — PROBE ABLAT XL 90DEG ASPIR BPLR RF 1 PC ELECTRD ERGO HNDL

## (undated) DEVICE — PAD DRY FLOOR ABS 32X58IN GRN

## (undated) DEVICE — PROTECTOR UNIV FOAM EXTREMITY DEVON

## (undated) DEVICE — SURGICAL SET UP - SURE SET: Brand: MEDLINE INDUSTRIES, INC.

## (undated) DEVICE — 1010 S-DRAPE TOWEL DRAPE 10/BX: Brand: STERI-DRAPE™

## (undated) DEVICE — SUTURE FIBERLOOP SZ 2-0 L20IN NONABSORBABLE BLU STR NDL AR7234

## (undated) DEVICE — SPONGE GZ W4XL8IN COT WVN 12 PLY

## (undated) DEVICE — GARMENT,MEDLINE,DVT,INT,CALF,MED, GEN2: Brand: MEDLINE

## (undated) DEVICE — KIT INSTR W/ 2.4MM GUIDEPIN SUT PASS WIRE NO2 FIBERWIRE

## (undated) DEVICE — PENCIL ES L3M ROCK SWCH S STL HEX LOK BLDE ELECTRD HOLSTER

## (undated) DEVICE — PRE OP PACK: Brand: MEDLINE INDUSTRIES, INC.

## (undated) DEVICE — SUTURE VCRL SZ 3-0 L27IN ABSRB UD L26MM CT-2 1/2 CIR J232H

## (undated) DEVICE — SOLUTION IV 1000ML 0.9% SOD CHL

## (undated) DEVICE — BUR SHAVER 5 MMX13 CM 8 FLUT OVL FOR AGGRESSIVE BNE COOLCUT

## (undated) DEVICE — TOWEL,OR,DSP,ST,BLUE,DLX,8/PK,10PK/CS: Brand: MEDLINE

## (undated) DEVICE — BLADE SHV L13CM DIA5MM EXCALIBUR AGG COOLCUT

## (undated) DEVICE — INTENDED FOR TISSUE SEPARATION, AND OTHER PROCEDURES THAT REQUIRE A SHARP SURGICAL BLADE TO PUNCTURE OR CUT.: Brand: BARD-PARKER ® CARBON RIB-BACK BLADES

## (undated) DEVICE — PUDDLEVAC FLOOR SUCTION DEVICE: Brand: PUDDLEVAC

## (undated) DEVICE — E-Z CLEAN, NON-STICK, PTFE COATED, ELECTROSURGICAL BLADE ELECTRODE, 2.5 INCH (6.35 CM): Brand: EZ CLEAN

## (undated) DEVICE — STOCKINETTE ORTH W9XL36IN COT 2 PLY HLLW FOR HANDLING LMB

## (undated) DEVICE — DBD-PACK,SHOULDER III: Brand: MEDLINE

## (undated) DEVICE — GLOVE SURG SZ 8 L12IN FNGR THK75MIL WHT LTX POLYMER BEAD

## (undated) DEVICE — KIT SHLDR STBL MARCO FOR SPIDER LIMB POS

## (undated) DEVICE — BANDAGE COBAN 4 IN COMPR W4INXL5YD FOAM COHESIVE QUIK STK SELF ADH SFT

## (undated) DEVICE — MEDI-VAC NON-CONDUCTIVE SUCTION TUBING: Brand: CARDINAL HEALTH

## (undated) DEVICE — 3M™ STERI-DRAPE™ U-DRAPE 1067 1067 5/BX 4BX/CS/CTN&#X20;: Brand: STERI-DRAPE™

## (undated) DEVICE — 3M™ STERI-DRAPE™ U-DRAPE 1015: Brand: STERI-DRAPE™

## (undated) DEVICE — EYE PROTECTOR FOAM MEDICHOICE

## (undated) DEVICE — SUTURE PDS II SZ 1 L27IN ABSRB VLT CT-1 L36MM 1/2 CIR Z341H

## (undated) DEVICE — SLING ARM SM 28CM BLK MESH FAB EZ OPN FR PNL W DEROTATION

## (undated) DEVICE — TUBING FLD MGMT Y DBL SPIK DUALWAVE

## (undated) DEVICE — TUBING PMP L16FT MAIN DISP FOR AR-6400 AR-6475

## (undated) DEVICE — 3M™ IOBAN™ 2 ANTIMICROBIAL INCISE DRAPE 6640EZ: Brand: IOBAN™ 2

## (undated) DEVICE — CHLORAPREP 26ML ORANGE

## (undated) DEVICE — SUTURE MCRYL SZ 4-0 L27IN ABSRB UD L19MM PS-2 1/2 CIR PRIM Y426H

## (undated) DEVICE — SUTURE FIBERWIRE SZ 2 W/ TAPERED NEEDLE BLUE L38IN NONABSORB BLU L26.5MM 1/2 CIRCLE AR7200